# Patient Record
Sex: FEMALE | Race: WHITE | Employment: OTHER
[De-identification: names, ages, dates, MRNs, and addresses within clinical notes are randomized per-mention and may not be internally consistent; named-entity substitution may affect disease eponyms.]

---

## 2017-09-10 ENCOUNTER — HEALTH MAINTENANCE LETTER (OUTPATIENT)
Age: 22
End: 2017-09-10

## 2018-10-04 ENCOUNTER — TRANSFERRED RECORDS (OUTPATIENT)
Dept: HEALTH INFORMATION MANAGEMENT | Facility: CLINIC | Age: 23
End: 2018-10-04

## 2018-10-08 ENCOUNTER — MEDICAL CORRESPONDENCE (OUTPATIENT)
Dept: HEALTH INFORMATION MANAGEMENT | Facility: CLINIC | Age: 23
End: 2018-10-08

## 2018-10-12 ENCOUNTER — PRE VISIT (OUTPATIENT)
Dept: ORTHOPEDICS | Facility: CLINIC | Age: 23
End: 2018-10-12

## 2018-10-12 NOTE — TELEPHONE ENCOUNTER
RECORDS RECEIV[facility] [place]   DATE RECEIVED: in process   NOTES STATUS DETAILS   OFFICE NOTE from referring provider Received    OFFICE NOTE from other specialist In process    DISCHARGE SUMMARY from hospital In process    DISCHARGE REPORT from the ER In process    OPERATIVE REPORT In process    MEDICATION LIST In process    IMPLANT RECORD/STICKER N/A    LABS     CBC/DIFF In process    CULTURES N/A    INJECTIONS DONE IN RADIOLOGY In process    MRI In process    CT SCAN In process    XRAYS (IMAGES & REPORTS) In process    TUMOR     PATHOLOGY  Slides & report N/A

## 2018-11-21 ENCOUNTER — HEALTH MAINTENANCE LETTER (OUTPATIENT)
Age: 23
End: 2018-11-21

## 2018-11-21 DIAGNOSIS — M41.9 SCOLIOSIS: Primary | ICD-10-CM

## 2018-11-26 NOTE — PROGRESS NOTES
[facility]  Orthopedics  Noé Mayen MD  2018     Name: Anthony Hayden  MRN: [id number]  Age: 23 year old  : 1995  Referring provider: Referred Self     Chief Complaint:   Back pain     History of Present Illness:   Anthony Hayden is a 23 year old female with a history of spinal fusion with Dr. Mayen in  who presents today for evaluation of back pain. She was referred to me by OSCAR Rudolph at the [facility]. She was in the emergency room on 2018 for acute low back pain. She saw Dr. Escoto on 10/4/2018 for an ER discharge follow up, and at this appointment she declined the option physical therapy and requested to see a spine specialist. A CT of her lumbar spine was performed with contusion noted from T12 through L3. There was also a shallow disc protrusion at L4-5 and a small central protrusion at L5-S1 (these images are not available for review).    Today, she reports significant worsening of her back pain after the birth of her child approximately 2 years ago.  She had mild pain prior to this however since then she has had much more severe pain.  At the time of childbirth she had an epidural which reportedly injured a nerve and it was thought that her pain may improve as the nerve improved, however, she has had no improvement over the past couple years.  She describes her primary pain generator as her low back.  She reports some pain into her left lower extremity greater than right.  Approximately 80% of her pain is in the back and 20% in the left lower extremity.  She also has some soreness in her thoracic back as well as her neck or left arm.  She intermittently gets numbness tingling in the left lower extremity.  She has no bowel or bladder incontinence.  She is tried chiropractic care as well as strong pain medicine without improvement.  She has not tried any formal physical therapy recently, however, she has been doing home  "exercises.    SRS score: 45%  PROMIS 10: 20  VAS back: 8/10  VAS left le/10    Review of Systems:   A 10-point review of systems was obtained and is negative except for as noted in the HPI.     Medications:   HYDROcodone-acetaminophen (NORCO) 5-325 mg tablet    medroxyPROGESTERone acetate (DEPO-PROVERA CONTRACEPTIVE) intramuscular suspension 150 mg      Allergies:  Promethazine  Smoke  Doxycycline  Pertussis Vaccines    Past Medical History:  Seasonal allergic rhinitis due to pollen  Scoliosis  Chronic vascular insufficiency of intestine    Past Surgical History:  Extracavitary arthrodesis with lumbar diskectomy  Stomach surgery    Social History:  Patient has a 1 yo son and significant other. Patient does not smoke and drinks socially.    Family History:  Breast cancer  Heart disease   Hypertension    Physical Examination:  Height 1.6 m (5' 3\"), weight 55.8 kg (123 lb 1.6 oz).  Constitutional - Patient is healthy, well-nourished and appears stated age.  Respiratory - Patient is breathing normally and in no respiratory distress.  Skin - No suspicious rashes or lesions. Surgical incisions are well-healed.   Psychiatric - Normal mood and affect.  Cardiovascular - Peripheral pulses are normal.  Eyes - Visual acuity is normal to the written word.  ENT - Hearing intact to the spoken word.  Musculoskeletal - Non-antalgic gait without use of assistive devices.  She stands from a chair and gains the exam table easily.  Sensation is subjectively diminished in the L3-L4 distribution of the LLE.  Sensation is intact in L5-S1 of the left lower extremity and L3 through S1 of the right lower extremity.  She has 5 out of 5 strength in hip flexion, knee extension, dorsiflexion, plantar flexion.  Negative clonus, 1+ patellar and Achilles reflexes.  Negative Babinski.  Positive Trendelenburg with single leg stance bilaterally.  Tenderness with palpation of the right quadratus lumborum.     Right Left   MALKA  neg pos   Thigh " Thrust: neg pos   Pelvic Compression Test  neg neg   Pelvic Gapping  neg neg   Gaenslen s Test  neg pos              Imaging:   XR Six Foot Standing Extremities and two views complete spine xrays (11/28/2018):  X-rays were obtained today.  These show previous instrumentation in place from T5-L3.  Implants are in stable position compared to previous x-rays.  She has had no change in her alignment proximal to the construct.  There is no degenerative changes.  She has mild retrolisthesis of L3 on 4.     Pelvic incidence 60, lumbar lordosis 41, L4-5 lordosis 17.    I have independently reviewed the above imaging studies; the results were discussed with the patient and the imaging was shown to the patient.     Assessment:   23 year old female with a history of PISF for scoliosis in 2008 now with primarily low back pain with a small component of left leg radicular symptoms. Possible component of L SI joint pain.    Plan:   We reviewed the patient's imaging and clinical findings with her. Given that her pain became significantly worse with pregnancy and delivery as well as 3 out of 5 positive SI provocative maneuvers on exam today it would be worth obtaining a diagnostic injection of her SI joints to see if this could be the primary cause of her symptoms.  In addition to this, we will also plan to have her obtain a sedated MRI given her issues with claustrophobia.  She was able to obtain a CT scan at outside facility, however, she has not been able to have an MRI because of her claustrophobia.  This will help us evaluate the lumbar spine for causes of radicular symptoms.  Finally, we will have her obtain flexion-extension x-rays prior to her next visit given the slight retrolisthesis of L3 on 4.  All questions were answered.  We will see the patient back after the above studies have been completed for further discussion.    Aman James MD   Orthopedic Surgery; PGY-4    This patient was seen, examined and counseled  by Dr. Mayen.     I saw and evaluated the patient and developed the plan.

## 2018-11-28 ENCOUNTER — OFFICE VISIT (OUTPATIENT)
Dept: ORTHOPEDICS | Facility: CLINIC | Age: 23
End: 2018-11-28
Payer: COMMERCIAL

## 2018-11-28 ENCOUNTER — HOSPITAL ENCOUNTER (OUTPATIENT)
Facility: CLINIC | Age: 23
End: 2018-11-28
Admitting: ORTHOPAEDIC SURGERY
Payer: COMMERCIAL

## 2018-11-28 ENCOUNTER — RADIANT APPOINTMENT (OUTPATIENT)
Dept: GENERAL RADIOLOGY | Facility: CLINIC | Age: 23
End: 2018-11-28
Attending: ORTHOPAEDIC SURGERY
Payer: COMMERCIAL

## 2018-11-28 VITALS — BODY MASS INDEX: 21.81 KG/M2 | HEIGHT: 63 IN | WEIGHT: 123.1 LBS

## 2018-11-28 DIAGNOSIS — M54.50 CHRONIC BILATERAL LOW BACK PAIN WITHOUT SCIATICA: Primary | ICD-10-CM

## 2018-11-28 DIAGNOSIS — G89.29 CHRONIC BILATERAL LOW BACK PAIN WITHOUT SCIATICA: Primary | ICD-10-CM

## 2018-11-28 DIAGNOSIS — M41.124 ADOLESCENT IDIOPATHIC SCOLIOSIS OF THORACIC REGION: ICD-10-CM

## 2018-11-28 DIAGNOSIS — M41.9 SCOLIOSIS: ICD-10-CM

## 2018-11-28 DIAGNOSIS — Z98.1 HISTORY OF SPINAL FUSION: ICD-10-CM

## 2018-11-28 RX ORDER — SERTRALINE HYDROCHLORIDE 25 MG/1
25 TABLET, FILM COATED ORAL DAILY
Status: ON HOLD | COMMUNITY
Start: 2018-11-02 | End: 2018-12-18

## 2018-11-28 NOTE — LETTER
2018       RE: Anthony Hayden  [address] [place] [id number]     Dear Colleague,    Thank you for referring your patient, Anthony Hayden, to [facility]. Please see a copy of my visit note below.    [facility]  Orthopedics  Noé Mayen MD  2018     Name: Anthony Hayden  MRN: [id number]  Age: 23 year old  : 1995  Referring provider: Referred Self     Chief Complaint:   Back pain     History of Present Illness:   Anthony Hayden is a 23 year old female with a history of spinal fusion with Dr. Mayen in  who presents today for evaluation of back pain. She was referred to me by OSCAR Rudolph at the [facility]. She was in the emergency room on 2018 for acute low back pain. She saw Dr. Escoto on 10/4/2018 for an ER discharge follow up, and at this appointment she declined the option physical therapy and requested to see a spine specialist. A CT of her lumbar spine was performed with contusion noted from T12 through L3. There was also a shallow disc protrusion at L4-5 and a small central protrusion at L5-S1 (these images are not available for review).    Today, she reports significant worsening of her back pain after the birth of her child approximately 2 years ago.  She had mild pain prior to this however since then she has had much more severe pain.  At the time of childbirth she had an epidural which reportedly injured a nerve and it was thought that her pain may improve as the nerve improved, however, she has had no improvement over the past couple years.  She describes her primary pain generator as her low back.  She reports some pain into her left lower extremity greater than right.  Approximately 80% of her pain is in the back and 20% in the left lower extremity.  She also has some soreness in her thoracic back as well as her neck or left arm.  She  "intermittently gets numbness tingling in the left lower extremity.  She has no bowel or bladder incontinence.  She is tried chiropractic care as well as strong pain medicine without improvement.  She has not tried any formal physical therapy recently, however, she has been doing home exercises.    SRS score: 45%  PROMIS 10: 20  VAS back: 8/10  VAS left le/10    Review of Systems:   A 10-point review of systems was obtained and is negative except for as noted in the HPI.     Medications:   HYDROcodone-acetaminophen (NORCO) 5-325 mg tablet    medroxyPROGESTERone acetate (DEPO-PROVERA CONTRACEPTIVE) intramuscular suspension 150 mg      Allergies:  Promethazine  Smoke  Doxycycline  Pertussis Vaccines    Past Medical History:  Seasonal allergic rhinitis due to pollen  Scoliosis  Chronic vascular insufficiency of intestine    Past Surgical History:  Extracavitary arthrodesis with lumbar diskectomy  Stomach surgery    Social History:  Patient has a 3 yo son and significant other. Patient does not smoke and drinks socially.    Family History:  Breast cancer  Heart disease   Hypertension    Physical Examination:  Height 1.6 m (5' 3\"), weight 55.8 kg (123 lb 1.6 oz).  Constitutional - Patient is healthy, well-nourished and appears stated age.  Respiratory - Patient is breathing normally and in no respiratory distress.  Skin - No suspicious rashes or lesions. Surgical incisions are well-healed.   Psychiatric - Normal mood and affect.  Cardiovascular - Peripheral pulses are normal.  Eyes - Visual acuity is normal to the written word.  ENT - Hearing intact to the spoken word.  Musculoskeletal - Non-antalgic gait without use of assistive devices.  She stands from a chair and gains the exam table easily.  Sensation is subjectively diminished in the L3-L4 distribution of the LLE.  Sensation is intact in L5-S1 of the left lower extremity and L3 through S1 of the right lower extremity.  She has 5 out of 5 strength in hip flexion, " knee extension, dorsiflexion, plantar flexion.  Negative clonus, 1+ patellar and Achilles reflexes.  Negative Babinski.  Positive Trendelenburg with single leg stance bilaterally.  Tenderness with palpation of the right quadratus lumborum.     Right Left   MALKA  neg pos   Thigh Thrust: neg pos   Pelvic Compression Test  neg neg   Pelvic Gapping  neg neg   Gaenslen s Test  neg pos              Imaging:   XR Six Foot Standing Extremities and two views complete spine xrays (11/28/2018):  X-rays were obtained today.  These show previous instrumentation in place from T5-L3.  Implants are in stable position compared to previous x-rays.  She has had no change in her alignment proximal to the construct.  There is no degenerative changes.  She has mild retrolisthesis of L3 on 4.     Pelvic incidence 60, lumbar lordosis 41, L4-5 lordosis 17.    I have independently reviewed the above imaging studies; the results were discussed with the patient and the imaging was shown to the patient.     Assessment:   23 year old female with a history of PISF for scoliosis in 2008 now with primarily low back pain with a small component of left leg radicular symptoms. Possible component of L SI joint pain.    Plan:   We reviewed the patient's imaging and clinical findings with her. Given that her pain became significantly worse with pregnancy and delivery as well as 3 out of 5 positive SI provocative maneuvers on exam today it would be worth obtaining a diagnostic injection of her SI joints to see if this could be the primary cause of her symptoms.  In addition to this, we will also plan to have her obtain a sedated MRI given her issues with claustrophobia.  She was able to obtain a CT scan at outside facility, however, she has not been able to have an MRI because of her claustrophobia.  This will help us evaluate the lumbar spine for causes of radicular symptoms.  Finally, we will have her obtain flexion-extension x-rays prior to her next  visit given the slight retrolisthesis of L3 on 4.  All questions were answered.  We will see the patient back after the above studies have been completed for further discussion.    Aman James MD   Orthopedic Surgery; PGY-4    This patient was seen, examined and counseled by Dr. Mayen.     I saw and evaluated the patient and developed the plan.      Again, thank you for allowing me to participate in the care of your patient.      Sincerely,    Noé Mayen MD

## 2018-11-28 NOTE — MR AVS SNAPSHOT
After Visit Summary   11/28/2018    Anthony Hayden    MRN: [id number]           Patient Information     Date Of Birth          1995        Visit Information        Provider Department      11/28/2018 8:30 AM Noé Mayen [facility]        Today's Diagnoses     Chronic bilateral low back pain without sciatica    -  1       Follow-ups after your visit        Your next 10 appointments already scheduled     Dec 14, 2018  9:00 AM CST   Procedure 3 hour with U2A ROOM 7   [address] [facility] [place] ([facility])    [address] [facility] [address]               Dec 14, 2018 10:00 AM CST   MR LUMBAR SPINE W/O CONTRAST with UU IR RN, UUMR1   [facility] [place], Interventional Radiology ([facility])    [address] [facility] [facility] [address] [phone]           How do I prepare for my exam? (Food and drink instructions) **If you will be receiving sedation or general anesthesia, please see special notes below.**  How do I prepare for my exam? (Other instructions) Take your medicines as usual, unless your doctor tells you not to. Please remove any body piercings and hair extensions before you arrive. Follow your doctor s orders. If you do not, we may have to postpone your exam. You may or may not receive IV contrast for this exam pending the discretion of the Radiologist.  You do not need to do anything special to prepare. **If you will be receiving sedation or general anesthesia, please see special notes below.**  What should I wear:  The MRI machine uses a strong magnet. Please wear clothes without metal (snaps, zippers). A sweatsuit works well, or we may give you a hospital gown.  How long does the exam take: Most tests take 30 to 60 minutes.  HOWEVER, IF YOUR DOCTOR PRESCRIBES ANESTHESIA please plan on spending four to five hours in the recovery room.  What should I bring:  Bring a list of your current medicines to your exam (including vitamins, minerals and over-the-counter drugs). Also bring the results of similar scans you may have had.  Do I need a : **If you will be receiving sedation or general anesthesia, please see special notes below.**  What should I do after the exam? No Restrictions, You may resume normal activities.  What is this test: MRI (magnetic resonance imaging) uses a strong magnet and radio waves to look inside the body. An MRA (magnetic resonance angiogram) does the same thing, but it lets us look at your blood vessels. A computer turns the radio waves into pictures showing cross sections of the body, much like slices of bread. This helps us see any problems more clearly.  Who should I call with questions: Please call the Imaging Department at your exam site with any questions. Directions, parking instructions, and other information is available on our website, [email or web address].  How do I prepare if I m having sedation or anesthesia? **IMPORTANT** THE INSTRUCTIONS BELOW ARE ONLY FOR THOSE PATIENTS WHO HAVE BEEN TOLD THEY WILL RECEIVE SEDATION OR GENERAL ANESTHESIA DURING THEIR MRI PROCEDURE:  IF YOU WILL RECEIVE SEDATION (take medicine to help you relax during your exam): You must get the medicine from your doctor before you arrive. Bring the medicine to the exam. Do not take it at home. Arrive one hour early. Bring someone who can take you home after the test. Your medicine will make you sleepy. After the exam, you may not drive, take a bus or take a taxi by yourself. No eating 8 hours before your exam. You may have clear liquids up until 4 hours before your exam. (Clear liquids include water, clear tea, black coffee and fruit juice without pulp.)  IF YOU WILL RECEIVE ANESTHESIA (be asleep for your exam): Arrive 1 1/2 hours early. Bring someone who can take you home after the test. You may not drive, take a bus or take a taxi by yourself. No eating 8  hours before your exam. You may have clear liquids up until 4 hours before your exam. (Clear liquids include water, clear tea, black coffee and fruit juice without pulp.) You will spend four to five hours in the recovery room.            Dec 14, 2018  3:00 PM CST   CT SACROILIAC JOINT INJECTION DIAGNOSTIC with URCT2, UR NEURO RAD   [facility], [place], Radiology ([facility])    [address] [place] [address] [phone]           How do I prepare for my exam? (Food and drink instructions) The day before your exam: Drink extra fluids  at least six 8-ounce glasses (unless your doctor tells you to restrict fluids).  The day of your exam: No eating or drinking for 4 hours before your test. You may take medicine with small sips of water  What should I wear: Please wear loose clothing, such as a sweat suit or jogging clothes. Avoid snaps, zippers and other metal. We may ask you to undress and put on a hospital gown.  How long does the exam take: Plan to spend up to 6 hours at the hospital. The test itself normally takes less than an hour. We will watch for side effects for 1 to 4 hours.  What should I bring: Please bring any scans or X-rays taken at other hospitals, if they may be helpful. Also bring a list of your medicines, including vitamins, minerals and over-the-counter drugs. It is safest to leave personal items at home.  Do I need a : Plan for an adult to drive you home and stay with you until morning.  What do I need to tell my doctor? Tell your doctor in advance: * If you have any allergies. * If you are breastfeeding or there s any chance you are pregnant. * If you are taking Coumadin (or any other blood thinners) 5 days prior to the exam for any special instructions. * If you are diabetic to determine if your insulin needs have to be adjusted for the exam.  What should I do after the exam: When you get home, you ll need to take it easy for the  rest of the day.  Do not drive for 24 hours. You may have slight bruising and mild pain in the area. If so, you may take acetaminophen (Tylenol) or ibuprofen (Motrin, Advil) after you get home.  Some people go home with a small tube (catheter) sewn into the skin. If this case, we will show you how to care for the tube.  What is this test: This test uses a very thin needle to remove tissue, fluid or other cells from your body. We then send the cells to a lab for testing. A biopsy tests for disease in a tissue sample. Aspiration tests for disease or infection in a fluid sample. We use pictures from a CT (computed tomography) scan to guide the needle to the right place. A CT scan is a special X-ray. The scanner creates images of the body in cross sections, much like slices of bread. You may receive contrast (X-ray dye) before or during your scan. Contrast is given through an IV (small needle in your arm) or taken by mouth.  Who should I call with questions: If you have any questions, please call the imaging department where you will have your exam. Directions, parking instructions, and other information is available on our website, [email or web address].            Jan 30, 2019 12:45 PM CST   (Arrive by 12:15 PM)   RETURN SCOLIOSIS with Noé Mayen MD   [facility] ([facility])    [address] [place] [address] [phone]              Future tests that were ordered for you today     Open Future Orders        Priority Expected Expires Ordered    MR Lumbar Spine w/o Contrast Routine  11/28/2019 11/28/2018    CT Sacroiliac Therapeutic Joint Injection Routine  11/28/2019 11/28/2018            Who to contact     Please call your clinic at [phone] to:    Ask questions about your health    Make or cancel appointments    Discuss your medicines    Learn about your test results    Speak to your doctor            Additional Information About Your  "Visit        [facility] Information     [facility] gives you secure access to your electronic health record. If you see a primary care provider, you can also send messages to your care team and make appointments. If you have questions, please call your primary care clinic.  If you do not have a primary care provider, please call [phone] and they will assist you.      [facility] is an electronic gateway that provides easy, online access to your medical records. With [facility], you can request a clinic appointment, read your test results, renew a prescription or communicate with your care team.     To access your existing account, please contact your [facility] or call [phone] for assistance.        Care EveryWhere ID     This is your Care EveryWhere ID. This could be used by other organizations to access your [place] medical records  [id number]        Your Vitals Were     Height BMI (Body Mass Index)                1.6 m (5' 3\") 21.81 kg/m2           Blood Pressure from Last 3 Encounters:   No data found for BP    Weight from Last 3 Encounters:   11/28/18 55.8 kg (123 lb 1.6 oz)               Primary Care Provider Office Phone # Fax #    Noel Escoto, SHAMAR [phone]       [place] [address]        Equal Access to Services     LUZ WARD AH: Hadii aad ku hadasho Soomaali, waaxda luqadaha, qaybta kaalmada adeegyada, waxay naomiein haynnamdin lemuel ace. So [facility] [phone].    ATENCIÓN: Si habla español, tiene a feldman disposición servicios gratAgilvaxos de asistencia lingüística. Llame al [phone].    We comply with applicable federal civil rights laws and [place] laws. We do not discriminate on the basis of race, color, national origin, age, disability, sex, sexual orientation, or gender identity.            Thank you!     Thank you for choosing [facility]  for your care. Our goal is always to provide you " with excellent care. Hearing back from our patients is one way we can continue to improve our services. Please take a few minutes to complete the written survey that you may receive in the mail after your visit with us. Thank you!             Your Updated Medication List - Protect others around you: Learn how to safely use, store and throw away your medicines at www.disposemymeds.org.          This list is accurate as of 11/28/18 10:24 AM.  Always use your most recent med list.                   Brand Name Dispense Instructions for use Diagnosis    sertraline 25 MG tablet    ZOLOFT     Take 25 mg by mouth daily

## 2018-11-28 NOTE — NURSING NOTE
"Reason For Visit:   Chief Complaint   Patient presents with     Spine - RECHECK       Primary MD: Noel Escoto      ?  No  Currently working? No.  Type of injury: Chronic.  Date of surgery: March 2008 by   Type of surgery: rods for scoliosis  Smoker: No    Ht 1.6 m (5' 3\")  Wt 55.8 kg (123 lb 1.6 oz)  BMI 21.81 kg/m2    Pain Assessment  Patient Currently in Pain: Yes  0-10 Pain Scale: 8  Primary Pain Location: Back  Pain Orientation: Mid  Other Pain Locations: down to back of knees, left arm, entire left side is worse  Pain Descriptors: Jabbing, Crushing, Sharp, Shooting, Discomfort, Constant, Aching, Tingling, Throbbing    Oswestry (ISABELLE) Questionnaire    No flowsheet data found.         Neck Disability Index (NDI) Questionnaire    No flowsheet data found.           Visual Analog Pain Scale  Back Pain Scale 0-10: 8  Right leg pain: 0  Left leg pain: 5  Neck Pain Scale 0-10: 6  Right arm pain: 0  Left arm pain: 3    Promis 10 Assessment    PROMIS 10 11/17/2018   In general, would you say your health is: Fair   In general, would you say your quality of life is: Very good   In general, how would you rate your physical health? Fair   In general, how would you rate your mental health, including your mood and your ability to think? Good   In general, how would you rate your satisfaction with your social activities and relationships? Good   In general, please rate how well you carry out your usual social activities and roles Good   To what extent are you able to carry out your everyday physical activities such as walking, climbing stairs, carrying groceries, or moving a chair? A little   How often have you been bothered by emotional problems such as feeling anxious, depressed or irritable? Always   How would you rate your fatigue on average? Moderate   How would you rate your pain on average?   0 = No Pain  to  10 = Worst Imaginable Pain 8   In general, would you say your health is: 2   In general, " would you say your quality of life is: 4   In general, how would you rate your physical health? 2   In general, how would you rate your mental health, including your mood and your ability to think? 3   In general, how would you rate your satisfaction with your social activities and relationships? 3   In general, please rate how well you carry out your usual social activities and roles. (This includes activities at home, at work and in your community, and responsibilities as a parent, child, spouse, employee, friend, etc.) 3   To what extent are you able to carry out your everyday physical activities such as walking, climbing stairs, carrying groceries, or moving a chair? 2   In the past 7 days, how often have you been bothered by emotional problems such as feeling anxious, depressed, or irritable? 5   In the past 7 days, how would you rate your fatigue on average? 3   In the past 7 days, how would you rate your pain on average, where 0 means no pain, and 10 means worst imaginable pain? 8   Global Mental Health Score 11   Global Physical Health Score 9   PROMIS TOTAL - SUBSCORES 20   Some recent data might be hidden                Merly Solis, ATC

## 2018-12-05 ENCOUNTER — TELEPHONE (OUTPATIENT)
Dept: INTERVENTIONAL RADIOLOGY/VASCULAR | Facility: CLINIC | Age: 23
End: 2018-12-05

## 2018-12-05 NOTE — IR NOTE
Interventional Radiology Nursing Note    Patient Name: Anthony Hayden  Medical Record Number: [id number]  Today's Date: December 5, 2018    Procedure: 12/14 MRI with IV sedation    D: On 12/14/18, Patient is scheduled for a MRI scan with IV sedation at 1000 in the [facility] and a CT epidural injection at 1400 in the [facility]. Unable to coordinate both procedures occurring on the same campus on 12/14/18.   A: Spoke with Patient and offered to have the procedures rescheduled on a different day so that she would not need to go from one campus to the other. Patient declined. Emailed driving instructions to the [place] and directions and parking information for the [facility] to Patient's personal email. She expressed an understanding of the information discussed.   P: Procedure instructions and the IR nurse line phone number was given to the Patient. She denied having questions or concerns. She will call the IR nurse line if any issues arise.    Sheri Boyer RN  Interventional Radiology Nurse line: [phone]

## 2018-12-11 ENCOUNTER — TELEPHONE (OUTPATIENT)
Dept: INTERVENTIONAL RADIOLOGY/VASCULAR | Facility: CLINIC | Age: 23
End: 2018-12-11

## 2018-12-11 RX ORDER — SODIUM CHLORIDE 9 MG/ML
INJECTION, SOLUTION INTRAVENOUS CONTINUOUS
Status: CANCELLED | OUTPATIENT
Start: 2018-12-11

## 2018-12-11 RX ORDER — NICOTINE POLACRILEX 4 MG
15-30 LOZENGE BUCCAL
Status: CANCELLED | OUTPATIENT
Start: 2018-12-11

## 2018-12-11 RX ORDER — DEXTROSE MONOHYDRATE 25 G/50ML
25-50 INJECTION, SOLUTION INTRAVENOUS
Status: CANCELLED | OUTPATIENT
Start: 2018-12-11

## 2018-12-11 RX ORDER — LIDOCAINE 40 MG/G
CREAM TOPICAL
Status: CANCELLED | OUTPATIENT
Start: 2018-12-11

## 2018-12-18 ENCOUNTER — HOSPITAL ENCOUNTER (OUTPATIENT)
Facility: CLINIC | Age: 23
Discharge: HOME | End: 2018-12-18
Attending: RADIOLOGY | Admitting: RADIOLOGY
Payer: COMMERCIAL

## 2018-12-18 ENCOUNTER — HOSPITAL ENCOUNTER (OUTPATIENT)
Dept: INTERVENTIONAL RADIOLOGY/VASCULAR | Facility: CLINIC | Age: 23
End: 2018-12-18
Attending: ORTHOPAEDIC SURGERY | Admitting: RADIOLOGY
Payer: COMMERCIAL

## 2018-12-18 ENCOUNTER — APPOINTMENT (OUTPATIENT)
Dept: MEDSURG UNIT | Facility: CLINIC | Age: 23
End: 2018-12-18
Attending: RADIOLOGY
Payer: COMMERCIAL

## 2018-12-18 VITALS
SYSTOLIC BLOOD PRESSURE: 103 MMHG | DIASTOLIC BLOOD PRESSURE: 69 MMHG | RESPIRATION RATE: 16 BRPM | OXYGEN SATURATION: 96 % | HEART RATE: 74 BPM

## 2018-12-18 VITALS
DIASTOLIC BLOOD PRESSURE: 70 MMHG | RESPIRATION RATE: 20 BRPM | OXYGEN SATURATION: 99 % | TEMPERATURE: 98.3 F | SYSTOLIC BLOOD PRESSURE: 109 MMHG

## 2018-12-18 DIAGNOSIS — M54.50 CHRONIC BILATERAL LOW BACK PAIN WITHOUT SCIATICA: ICD-10-CM

## 2018-12-18 DIAGNOSIS — G89.29 CHRONIC BILATERAL LOW BACK PAIN WITHOUT SCIATICA: ICD-10-CM

## 2018-12-18 LAB — B-HCG SERPL-ACNC: <1 IU/L (ref 0–5)

## 2018-12-18 PROCEDURE — 72148 MRI LUMBAR SPINE W/O DYE: CPT

## 2018-12-18 PROCEDURE — 84702 CHORIONIC GONADOTROPIN TEST: CPT | Performed by: RADIOLOGY

## 2018-12-18 PROCEDURE — 40000166 ZZH STATISTIC PP CARE STAGE 1

## 2018-12-18 PROCEDURE — 25000128 H RX IP 250 OP 636: Performed by: PHYSICIAN ASSISTANT

## 2018-12-18 RX ORDER — NICOTINE POLACRILEX 4 MG
15-30 LOZENGE BUCCAL
Status: DISCONTINUED | OUTPATIENT
Start: 2018-12-18 | End: 2018-12-19 | Stop reason: HOSPADM

## 2018-12-18 RX ORDER — DEXTROSE MONOHYDRATE 25 G/50ML
25-50 INJECTION, SOLUTION INTRAVENOUS
Status: CANCELLED | OUTPATIENT
Start: 2018-12-18

## 2018-12-18 RX ORDER — ONDANSETRON 2 MG/ML
4 INJECTION INTRAMUSCULAR; INTRAVENOUS EVERY 6 HOURS PRN
Status: CANCELLED | OUTPATIENT
Start: 2018-12-18

## 2018-12-18 RX ORDER — FENTANYL CITRATE 50 UG/ML
25-50 INJECTION, SOLUTION INTRAMUSCULAR; INTRAVENOUS EVERY 5 MIN PRN
Status: DISCONTINUED | OUTPATIENT
Start: 2018-12-18 | End: 2018-12-18 | Stop reason: HOSPADM

## 2018-12-18 RX ORDER — LIDOCAINE 40 MG/G
CREAM TOPICAL
Status: DISCONTINUED | OUTPATIENT
Start: 2018-12-18 | End: 2018-12-19 | Stop reason: HOSPADM

## 2018-12-18 RX ORDER — SODIUM CHLORIDE 9 MG/ML
INJECTION, SOLUTION INTRAVENOUS CONTINUOUS
Status: DISCONTINUED | OUTPATIENT
Start: 2018-12-18 | End: 2018-12-19 | Stop reason: HOSPADM

## 2018-12-18 RX ORDER — NALOXONE HYDROCHLORIDE 0.4 MG/ML
.1-.4 INJECTION, SOLUTION INTRAMUSCULAR; INTRAVENOUS; SUBCUTANEOUS
Status: DISCONTINUED | OUTPATIENT
Start: 2018-12-18 | End: 2018-12-18 | Stop reason: HOSPADM

## 2018-12-18 RX ORDER — DEXTROSE MONOHYDRATE 25 G/50ML
25-50 INJECTION, SOLUTION INTRAVENOUS
Status: DISCONTINUED | OUTPATIENT
Start: 2018-12-18 | End: 2018-12-19 | Stop reason: HOSPADM

## 2018-12-18 RX ORDER — ONDANSETRON 2 MG/ML
4 INJECTION INTRAMUSCULAR; INTRAVENOUS EVERY 6 HOURS PRN
Status: DISCONTINUED | OUTPATIENT
Start: 2018-12-18 | End: 2018-12-19 | Stop reason: HOSPADM

## 2018-12-18 RX ORDER — NICOTINE POLACRILEX 4 MG
15-30 LOZENGE BUCCAL
Status: CANCELLED | OUTPATIENT
Start: 2018-12-18

## 2018-12-18 RX ORDER — ONDANSETRON 2 MG/ML
4 INJECTION INTRAMUSCULAR; INTRAVENOUS ONCE
Status: COMPLETED | OUTPATIENT
Start: 2018-12-18 | End: 2018-12-18

## 2018-12-18 RX ORDER — FLUMAZENIL 0.1 MG/ML
0.2 INJECTION, SOLUTION INTRAVENOUS
Status: DISCONTINUED | OUTPATIENT
Start: 2018-12-18 | End: 2018-12-18 | Stop reason: HOSPADM

## 2018-12-18 RX ADMIN — FENTANYL CITRATE 75 MCG: 50 INJECTION, SOLUTION INTRAMUSCULAR; INTRAVENOUS at 15:34

## 2018-12-18 RX ADMIN — MIDAZOLAM HYDROCHLORIDE 1.5 MG: 2 INJECTION, SOLUTION INTRAMUSCULAR; INTRAVENOUS at 15:34

## 2018-12-18 RX ADMIN — ONDANSETRON 4 MG: 2 INJECTION INTRAMUSCULAR; INTRAVENOUS at 16:00

## 2018-12-18 NOTE — IP AVS SNAPSHOT
[facility], [place], Interventional Radiology  [address] [address]  Phone:  [phone]                                    After Visit Summary   12/18/2018    Anthony Hayden    MRN: [id number]           After Visit Summary Signature Page    I have received my discharge instructions, and my questions have been answered. I have discussed any challenges I see with this plan with the nurse or doctor.    ..........................................................................................................................................  Patient/Patient Representative Signature      ..........................................................................................................................................  Patient Representative Print Name and Relationship to Patient    ..................................................               ................................................  Date                                   Time    ..........................................................................................................................................  Reviewed by Signature/Title    ...................................................              ..............................................  Date                                               Time          22EPIC Rev 08/18

## 2018-12-18 NOTE — PRE-PROCEDURE
Interventional Radiology Pre-Procedure Sedation Assessment   Time of Assessment: 3:00 PM    Expected Level: Moderate Sedation    Indication: Sedation is required for the following type of Procedure: MRI sedation    Sedation and procedural consent: Risks, benefits and alternatives were discussed with Patient    PO Intake: Appropriately NPO for procedure    ASA Class: Class 2 - MILD SYSTEMIC DISEASE, NO ACUTE PROBLEMS, NO FUNCTIONAL LIMITATIONS.    Mallampati: Grade 2:  Soft palate, base of uvula, tonsillar pillars, and portion of posterior pharyngeal wall visible    Lungs: Lungs Clear with good breath sounds bilaterally    Heart: Normal heart sounds and rate    History and physical reviewed and no updates needed. I have reviewed the lab findings, diagnostic data, medications, and the plan for sedation. I have determined this patient to be an appropriate candidate for the planned sedation and procedure and have reassessed the patient IMMEDIATELY PRIOR to sedation and procedure.    Natanael Kovacs, DO

## 2018-12-18 NOTE — PROGRESS NOTES
Patient is ready for the procedure, Here for MRI with sedation. Step dad is with her. Very anxious and nervous. Hcg sent to lab, awaiting results

## 2018-12-18 NOTE — DISCHARGE INSTRUCTIONS
[facility]  Going Home after MRI with Sedation      For 24 hours:    An adult should stay with you.    Relax and take it easy.    DO NOT make any important legal decisions.    DO NOT drive or operate machines at home or at work.    Resume your regular diet and drink plenty of fluids.    CALL THE PHYSICIAN IF:    You develop nausea or vomiting    You develop hives or a rash or any unexplained itching    ADDITIONAL INSTRUCTIONS: None    [facility] INTERVENTIONAL RADIOLOGY DEPARTMENT        Procedure Physician:    Dr. Gomes    Date of procedure:December 18, 2018        Telephone numbers:     [phone]      Monday-Friday 8:00 am to 4:30 pm                                               [phone]      After 4:30 pm Monday-Friday, Weekends & Holidays. Ask for the Interventional Radiologist on call. Someone is  available 24 hours/day        [facility] toll free number: [phone]  Monday-Friday 8:00 am to 4:30 pm

## 2018-12-18 NOTE — PROGRESS NOTES
1555 Pt arrived on 2a post MRI with sedation. VSS Ra. Family at bedside. Pt exp mild nausea at this time, pt did receive zofran prior to arrival on 2a. 1615 Nausea resolved. Pt eating and drinking. 1645 Pt tolerating oral intake. Discharge instructions reviewed, copy given to pt. Pt tolerated ambulation. PIV dc'd. 1655 Pt discharged home accompanied by family.

## 2018-12-18 NOTE — PROGRESS NOTES
Patient Name: Anthony Hayden  Medical Record Number: [id number]  Today's Date: 12/18/2018    Procedure: MRI Lumbar spine with IV sedation    Sedation start time: 1458  Sedation end time: 1528  Sedation medications administered: versed 1.5 Mg;fentanyl   75mcg   Total sedation time: 30 minutes  Sedation Notes: Pt comfortable, VSS    MRI start time: 1515  Puncture time: no puncture  MRI end time: 1528    Report given to: 2A [place] [facility]ONE  : none needed    Other Notes: Pt arrived to MRI  from . Consent obtained. Pt denies any questions or concerns regarding procedure. Pt positioned supine and monitored per protocol. Pt tolerated procedure without any noted complications. Pt transferred back to .   sudden onset

## 2019-01-15 DIAGNOSIS — M41.9 SCOLIOSIS: Primary | ICD-10-CM

## 2019-01-16 ENCOUNTER — ANCILLARY PROCEDURE (OUTPATIENT)
Dept: GENERAL RADIOLOGY | Facility: CLINIC | Age: 24
End: 2019-01-16
Payer: COMMERCIAL

## 2019-01-16 ENCOUNTER — OFFICE VISIT (OUTPATIENT)
Dept: ORTHOPEDICS | Facility: CLINIC | Age: 24
End: 2019-01-16
Payer: COMMERCIAL

## 2019-01-16 VITALS — BODY MASS INDEX: 22.44 KG/M2 | WEIGHT: 126.7 LBS

## 2019-01-16 DIAGNOSIS — M41.9 SCOLIOSIS: ICD-10-CM

## 2019-01-16 DIAGNOSIS — M47.816 FACET ARTHROPATHY, LUMBAR: Primary | ICD-10-CM

## 2019-01-16 DIAGNOSIS — Z98.1 S/P SPINAL FUSION: ICD-10-CM

## 2019-01-16 DIAGNOSIS — G25.81 RESTLESS LEG SYNDROME: ICD-10-CM

## 2019-01-16 NOTE — NURSING NOTE
Reason For Visit:   Chief Complaint   Patient presents with     Spine - RECHECK     Follow Up     MRI results         Primary MD: Noel Escoto        ?  No  Currently working? No.  Type of injury: Chronic.  Date of surgery: March 2008 by   Type of surgery: rods for scoliosis  Smoker: No        Wt 57.5 kg (126 lb 11.2 oz)   BMI 22.44 kg/m      Pain Assessment  Patient Currently in Pain: Yes  0-10 Pain Scale: 9  Primary Pain Location: (mid to lower back)  Other Pain Locations: legs  Pain Descriptors: Aching, Throbbing, Sharp, Constant  Alleviating Factors: Heat  Aggravating Factors: Movement, Sitting, Walking, Standing, Bending, Stretching, Exercise, Stairs    Oswestry (ISABELLE) Questionnaire    No flowsheet data found.               Visual Analog Pain Scale  Back Pain Scale 0-10: 8.5  Right leg pain: 8.5(feels like restless leg syndrome)  Left leg pain: 8.5(feels like restless leg syndorme)    Promis 10 Assessment    PROMIS 10 1/14/2019   In general, would you say your health is: Fair   In general, would you say your quality of life is: Very good   In general, how would you rate your physical health? Fair   In general, how would you rate your mental health, including your mood and your ability to think? Good   In general, how would you rate your satisfaction with your social activities and relationships? Good   In general, please rate how well you carry out your usual social activities and roles Very good   To what extent are you able to carry out your everyday physical activities such as walking, climbing stairs, carrying groceries, or moving a chair? A little   How often have you been bothered by emotional problems such as feeling anxious, depressed or irritable? Always   How would you rate your fatigue on average? Mild   How would you rate your pain on average?   0 = No Pain  to  10 = Worst Imaginable Pain 7   In general, would you say your health is: 2   In general, would you say your quality  of life is: 4   In general, how would you rate your physical health? 2   In general, how would you rate your mental health, including your mood and your ability to think? 3   In general, how would you rate your satisfaction with your social activities and relationships? 3   In general, please rate how well you carry out your usual social activities and roles. (This includes activities at home, at work and in your community, and responsibilities as a parent, child, spouse, employee, friend, etc.) 4   To what extent are you able to carry out your everyday physical activities such as walking, climbing stairs, carrying groceries, or moving a chair? 2   In the past 7 days, how often have you been bothered by emotional problems such as feeling anxious, depressed, or irritable? 5   In the past 7 days, how would you rate your fatigue on average? 2   In the past 7 days, how would you rate your pain on average, where 0 means no pain, and 10 means worst imaginable pain? 7   Global Mental Health Score 11   Global Physical Health Score 10   PROMIS TOTAL - SUBSCORES 21   Some recent data might be hidden                Merly Solis, ATC

## 2019-01-16 NOTE — PROGRESS NOTES
"REASON FOR VISIT: Recheck low back and leg pain    REFERRING PHYSICIAN: Referred Self     PRIMARY CARE PHYSICIAN: Noel Escoto    HISTORY OF PRESENT ILLNESS: Anthony Hayden is a 23 year old female who returns to clinic today for further follow-up of low back and bilateral leg pain.  She has a history of posterior spinal fusion, which was performed by Dr. Mayen in March 2008.  She returned to clinic on 11/28/18 with significant low back and leg pain after a pregnancy.  At that time, the plan was to get a CT-guided sacroiliac joint injection.  However, she had to cancel the injection because she was sick, and she was not able to reschedule.  A few days after that appointment, she started to develop the same symptoms in the right leg.  She now reports pain in both legs that affects the posterior aspect from the thighs to the calves.  She states that they feel \"jumpy\" especially at night.  She has tried flexeril, which does not help.  She has never been evaluated for restless leg syndrome.  She is also here because of low back pain that no longer responds to ibuprofen or use of a TENS unit.         PHYSICAL EXAM:   Constitutional - Patient is healthy, well-nourished and appears stated age.    BMI = 22.44    Respiratory - Patient is breathing normally and in no respiratory distress.   Skin - No suspicious rashes or lesions.   Psychiatric - Normal mood and affect.   Cardiovascular - Extremities are warm and well perfused.   Eyes - Visual acuity is normal to the written word.   ENT - Hearing intact to the spoken word.   GI - No abdominal distention.   Musculoskeletal - Antalgic gait without use of assistive devices.          Thoracolumbar spine:    Appearance - Well healed surgical incision    Palpation - Non-tender to palpation in the midline and sacroiliac joints bilaterally;  TTP in the right lumbar facets    ROM - Deferred    Motor -        LOWER EXTREMITY Left Right   Hip flexion 5/5 5/5   Knee flexion 5/5 5/5 "   Knee extension 5/5 5/5   Ankle dorsiflexion 5/5 5/5   Ankle plantarflexion 5/5 5/5   Great toe extension 5/5 5/5        Special tests -     Facet loading - Positive bilaterally but more profound on the right     Neurologic - Sensation intact to light touch bilaterally in the lower extremities.        IMAGING: Radiographs of the full spine were obtained today.  They show instrumentation that is intact without evidence of loosening, fracture or migration.  Flexion/extension films of the lumbar spine were also obtained today.  They show slight retrolisthesis of L3 on L4 without evidence of instability. See full radiologic report in chart.    CLINICAL ASSESSMENT:   1. S/P posterior spinal fusion, T5-L3  2. Lumbar facet arthropathy  3. Restless leg syndrome    DISCUSSION/PLAN:   Anthony is a 23 year old female who returned to clinic today for further follow-up of low back and bilateral leg pain.  She was previously seen on 11/28/18 when an SI joint injection was suggested.  However, this was cancelled due to illness.  Today, her pain seems higher than the SI joints, and facet loading was profoundly positive.  We discussed this finding in detail, including the relevant anatomy.  At this time, I recommend a referral to the pain clinic for medial branch blocks at L3-4 bilaterally.  Additionally, I would like her to be assessed and potentially treated for restless leg syndrome.  If she gets relief, she can follow-up as needed.  If these interventions are insufficient, she should return to clinic to see Dr. Mayen for further evaluation.    25 minutes were spent with the patient, >50% of which was spent in discussion regarding the diagnosis, treatment options, and answering questions.    All questions and concerns were answered to the patient's apparent satisfaction before leaving the clinic.     Thank you for allowing Dr. Mayen and I to participate in the care of Anthony Hayden.    Respectfully,  Ananya Jones,  MARIVEL    CC  Copy to patient    [address] [address]

## 2019-01-16 NOTE — LETTER
"1/16/2019       RE: Anthony Hayden  [address] [place] [id number]     Dear Colleague,    Thank you for referring your patient, Anthony Hayden, to the [facility]. Please see a copy of my visit note below.    REASON FOR VISIT: Recheck low back and leg pain    REFERRING PHYSICIAN: Referred Self     PRIMARY CARE PHYSICIAN: Noel Escoto    HISTORY OF PRESENT ILLNESS: Anthony Hayden is a 23 year old female who returns to clinic today for further follow-up of low back and bilateral leg pain.  She has a history of posterior spinal fusion, which was performed by Dr. Mayen in March 2008.  She returned to clinic on 11/28/18 with significant low back and leg pain after a pregnancy.  At that time, the plan was to get a CT-guided sacroiliac joint injection.  However, she had to cancel the injection because she was sick, and she was not able to reschedule.  A few days after that appointment, she started to develop the same symptoms in the right leg.  She now reports pain in both legs that affects the posterior aspect from the thighs to the calves.  She states that they feel \"jumpy\" especially at night.  She has tried flexeril, which does not help.  She has never been evaluated for restless leg syndrome.  She is also here because of low back pain that no longer responds to ibuprofen or use of a TENS unit.         PHYSICAL EXAM:   Constitutional - Patient is healthy, well-nourished and appears stated age.    BMI = 22.44    Respiratory - Patient is breathing normally and in no respiratory distress.   Skin - No suspicious rashes or lesions.   Psychiatric - Normal mood and affect.   Cardiovascular - Extremities are warm and well perfused.   Eyes - Visual acuity is normal to the written word.   ENT - Hearing intact to the spoken word.   GI - No abdominal distention.   Musculoskeletal - Antalgic gait without use of assistive devices.          " Thoracolumbar spine:    Appearance - Well healed surgical incision    Palpation - Non-tender to palpation in the midline and sacroiliac joints bilaterally;  TTP in the right lumbar facets    ROM - Deferred    Motor -        LOWER EXTREMITY Left Right   Hip flexion 5/5 5/5   Knee flexion 5/5 5/5   Knee extension 5/5 5/5   Ankle dorsiflexion 5/5 5/5   Ankle plantarflexion 5/5 5/5   Great toe extension 5/5 5/5        Special tests -     Facet loading - Positive bilaterally but more profound on the right     Neurologic - Sensation intact to light touch bilaterally in the lower extremities.        IMAGING: Radiographs of the full spine were obtained today.  They show instrumentation that is intact without evidence of loosening, fracture or migration.  Flexion/extension films of the lumbar spine were also obtained today.  They show slight retrolisthesis of L3 on L4 without evidence of instability. See full radiologic report in chart.    CLINICAL ASSESSMENT:   1. S/P posterior spinal fusion, T5-L3  2. Lumbar facet arthropathy  3. Restless leg syndrome    DISCUSSION/PLAN:   Anthony is a 23 year old female who returned to clinic today for further follow-up of low back and bilateral leg pain.  She was previously seen on 11/28/18 when an SI joint injection was suggested.  However, this was cancelled due to illness.  Today, her pain seems higher than the SI joints, and facet loading was profoundly positive.  We discussed this finding in detail, including the relevant anatomy.  At this time, I recommend a referral to the pain clinic for medial branch blocks at L3-4 bilaterally.  Additionally, I would like her to be assessed and potentially treated for restless leg syndrome.  If she gets relief, she can follow-up as needed.  If these interventions are insufficient, she should return to clinic to see Dr. Mayen for further evaluation.    25 minutes were spent with the patient, >50% of which was spent in discussion regarding the  diagnosis, treatment options, and answering questions.    All questions and concerns were answered to the patient's apparent satisfaction before leaving the clinic.     Thank you for allowing Dr. Mayen and I to participate in the care of Anthony Hayden.    Respectfully,  Ananya Jones PA-C    CC  Copy to patient    [address] [place] [id number]

## 2019-02-21 ASSESSMENT — ENCOUNTER SYMPTOMS
DECREASED APPETITE: 1
MEMORY LOSS: 1
HEADACHES: 1
STIFFNESS: 0
COUGH DISTURBING SLEEP: 1
NECK PAIN: 1
RECTAL PAIN: 0
COUGH: 1
NERVOUS/ANXIOUS: 1
BOWEL INCONTINENCE: 0
MUSCLE CRAMPS: 1
DIARRHEA: 0
JOINT SWELLING: 0
HALLUCINATIONS: 0
NUMBNESS: 0
WEAKNESS: 1
SHORTNESS OF BREATH: 0
SPUTUM PRODUCTION: 0
SNORES LOUDLY: 1
MUSCLE WEAKNESS: 1
DIZZINESS: 1
JAUNDICE: 0
MYALGIAS: 1
SPEECH CHANGE: 0
LOSS OF CONSCIOUSNESS: 0
HEARTBURN: 1
POLYDIPSIA: 1
WEIGHT LOSS: 0
ALTERED TEMPERATURE REGULATION: 1
NAUSEA: 1
POSTURAL DYSPNEA: 0
BLOOD IN STOOL: 0
DISTURBANCES IN COORDINATION: 0
DEPRESSION: 0
POLYPHAGIA: 0
INCREASED ENERGY: 1
PARALYSIS: 0
HEMOPTYSIS: 0
SEIZURES: 0
WHEEZING: 0
BACK PAIN: 1
VOMITING: 1
DYSPNEA ON EXERTION: 0
CONSTIPATION: 0
TREMORS: 0
WEIGHT GAIN: 0
FEVER: 0
FATIGUE: 0
ABDOMINAL PAIN: 1
ARTHRALGIAS: 1
TINGLING: 1
CHILLS: 0
INSOMNIA: 1
DECREASED CONCENTRATION: 1
NIGHT SWEATS: 1
PANIC: 1
BLOATING: 0

## 2019-02-21 ASSESSMENT — ANXIETY QUESTIONNAIRES
GAD7 TOTAL SCORE: 20
7. FEELING AFRAID AS IF SOMETHING AWFUL MIGHT HAPPEN: NEARLY EVERY DAY
5. BEING SO RESTLESS THAT IT IS HARD TO SIT STILL: NEARLY EVERY DAY
3. WORRYING TOO MUCH ABOUT DIFFERENT THINGS: NEARLY EVERY DAY
2. NOT BEING ABLE TO STOP OR CONTROL WORRYING: NEARLY EVERY DAY
7. FEELING AFRAID AS IF SOMETHING AWFUL MIGHT HAPPEN: NEARLY EVERY DAY
GAD7 TOTAL SCORE: 20
6. BECOMING EASILY ANNOYED OR IRRITABLE: MORE THAN HALF THE DAYS
1. FEELING NERVOUS, ANXIOUS, OR ON EDGE: NEARLY EVERY DAY
4. TROUBLE RELAXING: NEARLY EVERY DAY

## 2019-02-22 ASSESSMENT — ANXIETY QUESTIONNAIRES: GAD7 TOTAL SCORE: 20

## 2019-03-07 ENCOUNTER — DOCUMENTATION ONLY (OUTPATIENT)
Dept: ORTHOPEDICS | Facility: CLINIC | Age: 24
End: 2019-03-07

## 2019-03-07 ENCOUNTER — OFFICE VISIT (OUTPATIENT)
Dept: ANESTHESIOLOGY | Facility: CLINIC | Age: 24
End: 2019-03-07
Payer: COMMERCIAL

## 2019-03-07 VITALS
SYSTOLIC BLOOD PRESSURE: 105 MMHG | WEIGHT: 130 LBS | DIASTOLIC BLOOD PRESSURE: 73 MMHG | RESPIRATION RATE: 16 BRPM | BODY MASS INDEX: 23.04 KG/M2 | HEIGHT: 63 IN | HEART RATE: 79 BPM

## 2019-03-07 DIAGNOSIS — M47.816 LUMBAR FACET ARTHROPATHY: Primary | ICD-10-CM

## 2019-03-07 DIAGNOSIS — G57.13 MERALGIA PARESTHETICA OF BOTH LOWER EXTREMITIES: ICD-10-CM

## 2019-03-07 RX ORDER — GABAPENTIN 300 MG/1
600 CAPSULE ORAL 3 TIMES DAILY
Qty: 180 CAPSULE | Refills: 0 | Status: SHIPPED | OUTPATIENT
Start: 2019-03-07 | End: 2022-07-02

## 2019-03-07 ASSESSMENT — MIFFLIN-ST. JEOR: SCORE: 1308.81

## 2019-03-07 ASSESSMENT — PAIN SCALES - GENERAL: PAINLEVEL: EXTREME PAIN (8)

## 2019-03-07 NOTE — PATIENT INSTRUCTIONS
1. Start gabapentin as follows:  1 tab= 300mg    AM   PM   Bedtime  0   0   300mg (1 tab).  After 1-3 days, increase as tolerated to the next line  300mg (1 tab)  0   300 (1 tab).  After 3-4 days, increase as tolerated to the next line  300mg (1 tab)  300mg (1 tab)  300 (1 tab).  After 3-4 days, increase as tolerated to the next line    AM   PM   Bedtime  300   300   600mg (2 tab).  After 3-4 days, increase as tolerated to the next line  600mg (2 tab)  300   600 (2 tab).  After 3-4 days, increase as tolerated to the next line  600mg (2 tab)  600mg (2 tab)  600 (2 tab).  After 3-4 days, increase as tolerated to the next line  Call with any problems or when you are at this dose. We can prescribe 600mg tabs going forwards.     Caution for sedation.   Do not drive until you know how the medication affects you.   You can go slower if you need to or increasing only one dose at a time.  Do not stop abruptly once at higher doses.  This medication must be tapered off.    2. External referral to pain psychology for CBT and biofeedback.     3. External referral to physical therapy.    4. Lidocaine 4% patches recommended.  You can purchase this medication over the counter at your pharmacy.  Use as directed.      5. Schedule procedure.      Please call [phone] to schedule, reschedule, or cancel your procedure appointment.   Phones are answered Monday - Friday from 7:30 - 4:00pm.  Leave a voicemail with your name, birth date, and phone number if no one is available to take your call.     Your procedure: Bilateral lumbar medial branch nerve block     On the day of the procedure  1. Arrive 1 hour earlier than your scheduled time, to the [facility]  Address: [address] [place] [id number]  2. Check in on the 5th floor for your procedure    For your diagnostic procedure, please fax in your Pain Diary.  Our fax number is [phone]    If you must reschedule your procedure more than two times, you must  follow up in clinic before rescheduling again.        Preparing for your procedure    CAUTION - FAILURE TO FOLLOW THESE PRE-PROCEDURE INSTRUCTIONS WILL RESULT IN YOUR PROCEDURE BEING RESCHEDULED.      Pregnancy  If you are pregnant, or think you may be pregnant, please notify our staff. This may or may not affect the ability to perform the procedure.       You must have a  take you home after your procedure. Transportation by taxi or para-transit is okay as long as you have a responsible adult accompany you. You must provide your 's full name and contact number at time of check in.     Fasting Protocol You may have NOTHING SOLID TO EAT 8 HOURS prior to arrival at the procedure area.     You may have CLEAR LIQUIDS UP TO 2 HOURS prior to arrival.    Broth and candy are considered solid food and require an eight hour fast.     Clear liquids include water, clear fruit juice (no pulp), carbonated beverages, ice, black coffee, black tea, clear jello. No alcohol containing beverages.   Medications If you take any medications, DO NOT STOP. Take your medications as usual the day of your procedure with a sip of water AT LEAST 2 HOURS PRIOR TO ARRIVAL.    Antibiotics If you are currently taking antibiotics, you must complete the entire dose 7 days prior to your scheduled procedure. You must be clear of any signs or symptoms of infection. If you begin antibiotics, please contact our clinic for instructions.     Fever, Chills, or Rash If you experience a fever of higher than 100 degrees, chills, rash, or open wounds during the one week before your procedure, please call the clinic to see if you may proceed with your procedure.      Medication Hold List  **Patients under Cardiology/Neurology care should consult their provider prior to the pain procedure to verify pre-procedure medication instructions. The information below contains general guidelines.**    Blood Thinners If you are taking daily ASPIRIN, PLAVIX,  OR OTHER BLOOD THINNERS SUCH AS COUMADIN/WARFARIN, we will need your prescribing doctor to sign a release permitting you to stop these medications. Once approved by your prescribing doctor - STOP ALL BLOOD THINNERS BASED ON THE TIME TABLE BELOW PRIOR TO YOUR PROCEDURE. If you have been instructed to stop WARFARIN(COUMADIN), you must have an INR lab drawn the day before your procedure. . Your INR must be within normal limits before we can perform your injection. MEDICATIONS CAN BE RESTARTED AFTER YOUR PROCEDURE.    14 DAY HOLD  Ticlid (ticlopidine)    10 DAY HOLD  Effient (Prasugel)    3 DAY HOLD  Xarelto (rivaroxaban) 7 DAY HOLD  Anacin, Bufferin, Ecotrin, Excedrin, Aggrenox (Aspirin)  Brilinta (ticagrelor)  Coumadin (Warfarin)  Pradexa (Dabigatran)  Elmiron (Pentosan)  Plavix (Clopidogrel Bisulfate)  Pletal (Cilostazol)    24 HOUR HOLD  Lovenox (enoxaparin)  Agrylin (Anagrelide)        Non-steroidal Anti-inflammatories (NSAIDs) DO NOT TAKE any non-steroidal anti-inflammatory medications (NSAIDs) listed on the table below. MEDICATIONS CAN BE RESTARTED AFTER YOUR PROCEDURE. Celebrex is OK to take and does not need to be discontinued.     Medications to stop:  3 DAY HOLD  Advil, Motrin (Ibuprofen)  Arthrotec (diciofenac sodium/misoprostol)  Clinoril (Sulindac)  Indocin (Indomethacin)  Lodine (Etodolac)  Toradol (Ketorolac)  Vicoprofen (Hydrocodone and Ibuprofen)  Voltaren (Diclotenac)    14 DAY HOLD  Daypro (Oxaprozin)  Feldene (Piroxicam)   7 DAY HOLD  Aleve (Naproxen sodium)  Darvon compound (contains aspirin)  Naprosyn (Naproxen)  Norgesic Forte (contains aspirin)  Mobic (Meloxicam)  Oruvall (Ketoprofen)  Percodan (contains aspirin)  Relafen (Nabumetone)  Salsalate  Trilisate  Vitamin E (more than 400 mg per day)  Any medication containing aspirin                To speak with a nurse, schedule/reschedule/cancel a clinic appointment, or request a medication refill call: [phone]     You can also reach us by  MyChart: [email or web address]

## 2019-03-07 NOTE — NURSING NOTE
AVS reviewed with pt and given copy.  Pre-procedure instructions reviewed, including NPO orders,  needed, and medications to hold.  Pt verbalized understanding and declined having questions at this time.     Rylee Ordonez, RN, BSN

## 2019-03-07 NOTE — PROGRESS NOTES
"I had the pleasure of meeting Ms. Anthony Hayden on 3/7/2019 in the outpatient pain clinic in consult for Dr. Jones with regards to her back pain.  Subjective:  23 year old female with past medical history of multilevel spinal fusion presents for evaluation of low back pain. Pain has been present for about 4-5 years.  Pain has been worse over the past 2 years since the birth of her son.  Over the past 2 years, the pain has been getting progressively worse without any acute changes recently.  Pain started with starting work in 2014.  Has been having severe pain over the past week after dancing.  The pain is achy and throbbing. The pain is constant. Pain can be severe at times, but waxes and wanes in severity. Average pain is 6/10. Pain is better with rest, medications. Worse with prolonged activity.   The patient denies focal lower extremity weakness. No lower extremity sensory changes. No incontinence of bowel or bladder.    Location: low central and paracentral lumbar region with radiation laterally across the low back and down the back of bilateral legs with anterior thigh and bilateral toes.  Quality: Throbbing, aching, \"like something trying to stab out of my skin\" in low back and posterior thighs Stinging pins and needles in the anterior thigh and feet.  Severity: 6/10, least is 4/10, most severe \"20/10\"  Timing: No discernable pattern  Modifying Factors: Worse with dancing, increased activity.  Exercise worsens back pain, rest worsens leg symptoms.  Previously improved with heat, OTC meds, not any longer.  Associated Signs/Symptoms: weakness of the legs with near falls, no stereotypical muscle group or activity, also reports neck pain, shoulder pain, and frequent dropping of objects with the hands.  No persistent arm or leg symptoms, no bowel or bladder incontinence.  Also reports right sided occipital and temporal headache associated with neck and back pain.    Current treatments " include:  None    Previous medication treatments included:  Anti-convulsants: None tried  Muscle relaxors: cyclobenzaprine, non beneficial  Anti-depressants: TCAs, SNRIs not tried  NSAIDs: Non beneficial  Acetaminophen: Non beneficial  Topicals: reports OTC topical medications non beneficial  Opioids: Used previously    Other treatments have included:  Anthony Hayden has not been seen at a pain clinic in the past.    physical therapy: previously, multiple years ago, non-beneficial  Acupuncture: not tried  TENs Unit: Uses, previously beneficial  Injections: not tried    Past Medical History:   Diagnosis Date     Depression     past medical history reviewed with patient.   Past Surgical History:   Procedure Laterality Date     BACK SURGERY       VASCULAR SURGERY      past surgical history reviewed with patient.   Medications:  No current outpatient medications on file.     No current facility-administered medications for this visit.      [place] and [place] Prescription Monitoring Program reviewed    Allergies:     Allergies   Allergen Reactions     Phenergan  [Adgan] Anaphylaxis     Phenergan [Promethazine Hcl]      Cats      Dry itchy eyes       Pertussis Vaccine      Doxycycline Rash, Anxiety, Hives and Itching     Family History:  family history is not on file.  Social history: she lives in [place]. she is not currently working. She worked previously at a manufacturing plant.  She is , stays home with her 2 year old son, and presents today with her .  Answers for HPI/ROS submitted by the patient on 2/21/2019   LEILA 7 TOTAL SCORE: 20  General Symptoms: Yes  Skin Symptoms: No  HENT Symptoms: No  EYE SYMPTOMS: No  HEART SYMPTOMS: No  LUNG SYMPTOMS: Yes  INTESTINAL SYMPTOMS: Yes  URINARY SYMPTOMS: No  GYNECOLOGIC SYMPTOMS: No  BREAST SYMPTOMS: No  SKELETAL SYMPTOMS: Yes  BLOOD SYMPTOMS: No  NERVOUS SYSTEM SYMPTOMS: Yes  MENTAL HEALTH SYMPTOMS: Yes  Fever: No  Loss of appetite: Yes  Weight loss: No  Weight gain:  "No  Fatigue: No  Night sweats: Yes  Chills: No  Increased stress: Yes  Excessive hunger: No  Excessive thirst: Yes  Feeling hot or cold when others believe the temperature is normal: Yes  Loss of height: No  Post-operative complications: No  Surgical site pain: Yes  Hallucinations: No  Change in or Loss of Energy: Yes  Hyperactivity: No  Confusion: Yes  Cough: Yes  Sputum or phlegm: No  Coughing up blood: No  Difficulty breating or shortness of breath: No  Snoring: Yes  Wheezing: No  Difficulty breathing on exertion: No  Nighttime Cough: Yes  Difficulty breathing when lying flat: No  Heart burn or indigestion: Yes  Nausea: Yes  Vomiting: Yes  Abdominal pain: Yes  Bloating: No  Constipation: No  Diarrhea: No  Blood in stool: No  Black stools: No  Rectal or Anal pain: No  Fecal incontinence: No  Yellowing of skin or eyes: No  Vomit with blood: No  Change in stools: No  Back pain: Yes  Muscle aches: Yes  Neck pain: Yes  Swollen joints: No  Joint pain: Yes  Bone pain: No  Muscle cramps: Yes  Muscle weakness: Yes  Joint stiffness: No  Bone fracture: No  Trouble with coordination: No  Dizziness or trouble with balance: Yes  Fainting or black-out spells: No  Memory loss: Yes  Headache: Yes  Seizures: No  Speech problems: No  Tingling: Yes  Tremor: No  Weakness: Yes  Difficulty walking: No  Paralysis: No  Numbness: No  Nervous or Anxious: Yes  Depression: No  Trouble sleeping: Yes  Trouble thinking or concentrating: Yes  Mood changes: No  Panic attacks: Yes    Objective:   /73   Pulse 79   Resp 16   Ht 1.6 m (5' 3\")   Wt 59 kg (130 lb)   BMI 23.03 kg/m    Body mass index is 23.03 kg/m .  General: In no apparent distress  Mental status: Normal affect, pleasant  Head: Atraumatic, normocephalic  Eyes: Extra-ocular movements grossly intact, no scleral icterus  Cardiovascular: Regular rate  Respiratory: No respiratory distress  Abdomen: soft, non-distended  Msk: Bilateral hips, knees have normal range of motion. Pain " with extension and rotation of lumbar spine  Multiple trigger points identified in the neck and shoulders causing radiating pain down the arms, down the legs, and into the neck and head.  TTP over the low lumbar spinous processes and paraspinal musculature without radiation.  Moderate TTP over bilateral SI joints.  Pain with palpation of bilateral piriformis muscles with radiation down the posterior thighs with palpation and seated piriformis stretch.  Neuro: AAOx3.   CN II-XII are grossly intact.   Strength 5/5 for bilateral hip flexion, knee extension, dorsiflexion, EHL, and plantarflexion. Sensation intact to light touch throughout the L2-S1 dermatomes of the bilateral lower extremities with the exception of decreased sharp sensation over bilateral anterolateral thighs. Reflexes 2+/4 and symmetric for bilateral patellar, achilles.  Negative babinski bilaterally. No clonus on ankle jerk  Skin: No rashes or lesions noted on exposed areas of skin    Imaging:   MR LUMBAR SPINE W/O CONTRAST 12/18/2018 3:33 PM     History: low back pain s/p scoliosis fusion down to L3.; Chronic  bilateral low back pain without sciatica; Chronic bilateral low back  pain without sciatica.     ICD-10: Chronic bilateral low back pain without sciatica; Chronic  bilateral low back pain without sciatica     Comparison: None     Technique: Sagittal STIR, T1-weighted turbo spin-echo, 3-D volumetric  T2-weighted and axial reconstructed T2-weighted images of the lumbar  spine were obtained without intravenous contrast.      Findings: Regarding numbering convention, there are 5 lumbar-type  vertebrae assumed for the purposes of this dictation. Surgical changes  of instrumented fusion visualized from T11 through L3 with  transpedicular screws and interconnecting fusion rods. No bone marrow  edema. Mild scoliosis with convexity to the left. Normal alignment of  the lumbar vertebra. Conus medullaris at L1-L2 junction. Normal cauda  equina  fibers.     Small posterior disc bulge at L3-4. No spinal canal or neural  foraminal stenosis.     Small posterior disc bulge at L4-5. Facet arthropathy mild neural  foraminal stenosis bilaterally. Mild neural foraminal stenosis  bilaterally.     Disc degeneration at L5-S1 with posterior disc bulge and superimposed  central disc protrusion and central annular fissure. Accompanying mild  degenerative facet arthropathy. No spinal canal or neural foraminal  stenosis.      No spinal canal or neural foraminal stenosis at other levels.     Partially visualized 1.6 cm left physiologic follicular cyst.                                                                       Impression:  1. Multilevel lumbar spondylosis, most pronounced at L4-5 with mild  neural foraminal stenosis.  2. Central annular fissure at L5-S1.  3. Multilevel posterior fusion at the starting from T10 through to L3.     Assessment:  1. Myofascial pain: Widespread myofascial pain and tenderness noted with multiple trigger points with radiating pain identified.  Likely primary contributor to her widespread pain outside the lumbar spine and secondary contributor to lumbar spine pain.  Worsened in the setting of pre-existing anxiety disorder and long standing chronic pain.  Will benefit from PT, CBT, biofeedback, and TPI which were performed today in clinic.  2. Lumbar spondylosis: Likely primary generator of low back pain in the setting of multiple level thoracolumbar fusion.  Worst by MRI at L4/L5, will try MBB to evaluate for benefit from RFA  3. Piriformis syndrome: As described in history and as provoked on exam.  Referral to PT for stretching guidance.  Consider piriformis injection in the future if continues to have difficulty  4. Meralgia paresthetica: No symptoms prior to pregnancy with persistent painful numbness over bilateral anterolateral thighs.  Reports has tried lidocaine topical before without benefit.  Has not tried neuropathic pain  medication, will trial gabapentin  5. Headaches: Likely multifactorial with some component of myofascial neck pain contributing to posterior right sided headache.  Consider TIN blocks in the future.    Barriers:  1. Patient has multiple painful areas and most of her pain is exacerbated by external stressors, anxiety disorders, and fear avoidant behaviors    Plan:   1. Patient education: I went over the above diagnoses and treatment plan with her and answered all of her questions.  2. Imaging review: MRI and XR reviewed.  3. Exercise program: Referral to PT to evaluate for therapeutic movements and stretching for myofascial pain and piriformis syndrome.  Also recommended Thera cane to patient for home massage.  4. Medications: Trial gabapentin titration with goal dose of 600mg TID  5. Imaging Orders: No further imaging at this time  6. Interventions:   1. Plan for bilateral lumbar MBB  2. Consider bilateral TIN blocks for migraine headaches  3. Consider repeat TPI for recurrent myofascial pain  4. Consider piriformis injection in the future if unresponsive to PT  Based on history and physical exam, unlikely tumor or mass causing pain  7. Referrals:   1. Physical therapy for myofascial pain, piriformis syndrome  2. Behavioral health for CBT, biofeedback for myofascial pain, headaches, chronic widespread pain  8. Follow up: 2-3 months    Thank you for the consult.   Kaz Morales  PGY5  Pain Medicine Clinical Fellow  Department of Anesthesiology and Pain Management    TRIGGER POINT INJECTION PROCEDURE NOTE     INDICATION FOR PROCEDURE:   Anthony Hayden  is a 23 year old old patient with myofascial pain resulting in difficulty with activities of daily living and reduced quality of life.   Her baseline symptoms have been recalcitrant to oral & transdermal medications and conservative therapy. She is here today for trigger point injections.   GOAL OF PROCEDURE:   The goal of this procedure is to increase active range of  motion, improve volitional motor control and enhance functional independence associated with dystonic movements.   TOTAL DOSE ADMINISTERED:   Medication used: Triamcinolone 40 mg and Bupivacaine 0.5%  Total Volume of Diluent Used: 10 ml     EQUIPMENT USED:   10 ml syringe, 0.5 inch 30 gauge needle   SKIN PREPARATION:   Skin preparation was performed using chloroprep.     PROCEDURE DETAILS:   The patient was met in the exam  room, where the patient was identified by name, medical record number and date of birth.  All of the patient s last minute questions were answered. Written informed consent was obtained and saved in the electronic medical record, after the risks, benefits, and alternatives were discussed with the patient.   A formal time-out procedure was performed, as per protocol, including patient name, title of procedure, and site of procedure, and all in the room concurred.   We  identified 11 trigger points in the bilateral neck and shoulder region by manual palpation. Solution containing 9 mL of 0.5 percent bupivacaine and 1 mL of triamcinolone 40 milligrams per mL was injected into these trigger points 0.5- 1 mL each.     MUSCLE INJECTED  - Trapezius- bilateral  -  Rhomboid major and minor- bilateral   -  Longus capitis- bilateral   -  Cervical paraspinals-bilateral   -  Infraspinatus - bilateral    RESPONSE TO PROCEDURE: Anthony tolerated the procedure well and there were no immediate complications. She was allowed to recover for an appropriate period of time and was discharged home in stable condition.   .  FOLLOW UP:   Anthony was asked to follow up by phone in 7-14 days  to report her response to this injection.    Physician Attestation   I saw and evaluated Anthony Hayden.  I agree with above history, review of systems, physical exam and plan.  I have reviewed the content of the documentation and have edited it as needed. I have personally performed the services documented here and the documentation  accurately represents those services and the decisions I have made.     Rainer Holguin MD, PhD    [facility]  [address]  [address] [facility] [id number] [phone]  Dept: [phone]

## 2019-03-07 NOTE — LETTER
"3/7/2019       RE: Anthony Hayden  [address] [place], [place] [id number]     Dear Colleague,    Thank you for referring your patient, Anthony Hayden, to the [facility]. Please see a copy of my visit note below.    I had the pleasure of meeting Ms. Anthony Hayden on 3/7/2019 in the outpatient pain clinic in consult for Dr. Jones with regards to her back pain.    Subjective:  23 year old female with past medical history of multilevel spinal fusion presents for evaluation of low back pain. Pain has been present for about 4-5 years.  Pain has been worse over the past 2 years since the birth of her son.  Over the past 2 years, the pain has been getting progressively worse without any acute changes recently.  Pain started with starting work in 2014.  Has been having severe pain over the past week after dancing.  The pain is achy and throbbing. The pain is constant. Pain can be severe at times, but waxes and wanes in severity. Average pain is 6/10. Pain is better with rest, medications. Worse with prolonged activity.   The patient denies focal lower extremity weakness. No lower extremity sensory changes. No incontinence of bowel or bladder.    Location: low central and paracentral lumbar region with radiation laterally across the low back and down the back of bilateral legs with anterior thigh and bilateral toes.  Quality: Throbbing, aching, \"like something trying to stab out of my skin\" in low back and posterior thighs Stinging pins and needles in the anterior thigh and feet.  Severity: 6/10, least is 4/10, most severe \"20/10\"  Timing: No discernable pattern  Modifying Factors: Worse with dancing, increased activity.  Exercise worsens back pain, rest worsens leg symptoms.  Previously improved with heat, OTC meds, not any longer.  Associated Signs/Symptoms: weakness of the legs with near falls, no stereotypical muscle " "group or activity, also reports neck pain, shoulder pain, and frequent dropping of objects with the hands.  No persistent arm or leg symptoms, no bowel or bladder incontinence.  Also reports right sided occipital and temporal headache associated with neck and back pain.    Current treatments include:  None    Previous medication treatments included:  Anti-convulsants: None tried  Muscle relaxors: cyclobenzaprine, non beneficial  Anti-depressants: TCAs, SNRIs not tried  NSAIDs: Non beneficial  Acetaminophen: Non beneficial  Topicals: reports OTC topical medications non beneficial  Opioids: Used previously    Other treatments have included:  Anthony Hayden has not been seen at a pain clinic in the past.    physical therapy: previously, multiple years ago, non-beneficial  Acupuncture: not tried  TENs Unit: Uses, previously beneficial  Injections: not tried    Past Medical History:   Diagnosis Date     Depression     past medical history reviewed with patient.   Past Surgical History:   Procedure Laterality Date     BACK SURGERY       VASCULAR SURGERY      past surgical history reviewed with patient.   Medications:  No current outpatient medications on file.     No current facility-administered medications for this visit.      [place] and [place] Prescription Monitoring Program reviewed    Allergies:     Allergies   Allergen Reactions     Phenergan  [Adgan] Anaphylaxis     Phenergan [Promethazine Hcl]      Cats      Dry itchy eyes       Pertussis Vaccine      Doxycycline Rash, Anxiety, Hives and Itching       Family History:  family history is not on file.  Social history: she lives in [place]. she is not currently working. She worked previously at a manufacturing plant.  She is , stays home with her 2 year old son, and presents today with her .    Objective:   /73   Pulse 79   Resp 16   Ht 1.6 m (5' 3\")   Wt 59 kg (130 lb)   BMI 23.03 kg/m     Body mass index is 23.03 kg/m .  General: In no apparent " distress  Mental status: Normal affect, pleasant  Head: Atraumatic, normocephalic  Eyes: Extra-ocular movements grossly intact, no scleral icterus  Cardiovascular: Regular rate  Respiratory: No respiratory distress  Abdomen: soft, non-distended  Msk: Bilateral hips, knees have normal range of motion. Pain with extension and rotation of lumbar spine  Multiple trigger points identified in the neck and shoulders causing radiating pain down the arms, down the legs, and into the neck and head.  TTP over the low lumbar spinous processes and paraspinal musculature without radiation.  Moderate TTP over bilateral SI joints.  Pain with palpation of bilateral piriformis muscles with radiation down the posterior thighs with palpation and seated piriformis stretch.  Neuro: AAOx3.   CN II-XII are grossly intact.   Strength 5/5 for bilateral hip flexion, knee extension, dorsiflexion, EHL, and plantarflexion. Sensation intact to light touch throughout the L2-S1 dermatomes of the bilateral lower extremities with the exception of decreased sharp sensation over bilateral anterolateral thighs. Reflexes 2+/4 and symmetric for bilateral patellar, achilles.  Negative babinski bilaterally. No clonus on ankle jerk  Skin: No rashes or lesions noted on exposed areas of skin    Imaging:   MR LUMBAR SPINE W/O CONTRAST 12/18/2018 3:33 PM     History: low back pain s/p scoliosis fusion down to L3.; Chronic  bilateral low back pain without sciatica; Chronic bilateral low back  pain without sciatica.     ICD-10: Chronic bilateral low back pain without sciatica; Chronic  bilateral low back pain without sciatica     Comparison: None     Technique: Sagittal STIR, T1-weighted turbo spin-echo, 3-D volumetric  T2-weighted and axial reconstructed T2-weighted images of the lumbar  spine were obtained without intravenous contrast.      Findings: Regarding numbering convention, there are 5 lumbar-type  vertebrae assumed for the purposes of this dictation.  Surgical changes  of instrumented fusion visualized from T11 through L3 with  transpedicular screws and interconnecting fusion rods. No bone marrow  edema. Mild scoliosis with convexity to the left. Normal alignment of  the lumbar vertebra. Conus medullaris at L1-L2 junction. Normal cauda  equina fibers.     Small posterior disc bulge at L3-4. No spinal canal or neural  foraminal stenosis.     Small posterior disc bulge at L4-5. Facet arthropathy mild neural  foraminal stenosis bilaterally. Mild neural foraminal stenosis  bilaterally.     Disc degeneration at L5-S1 with posterior disc bulge and superimposed  central disc protrusion and central annular fissure. Accompanying mild  degenerative facet arthropathy. No spinal canal or neural foraminal  stenosis.      No spinal canal or neural foraminal stenosis at other levels.     Partially visualized 1.6 cm left physiologic follicular cyst.                                                                    Impression:  1. Multilevel lumbar spondylosis, most pronounced at L4-5 with mild  neural foraminal stenosis.  2. Central annular fissure at L5-S1.  3. Multilevel posterior fusion at the starting from T10 through to L3.     Assessment:  1. Myofascial pain: Widespread myofascial pain and tenderness noted with multiple trigger points with radiating pain identified.  Likely primary contributor to her widespread pain outside the lumbar spine and secondary contributor to lumbar spine pain.  Worsened in the setting of pre-existing anxiety disorder and long standing chronic pain.  Will benefit from PT, CBT, biofeedback, and TPI which were performed today in clinic.  2. Lumbar spondylosis: Likely primary generator of low back pain in the setting of multiple level thoracolumbar fusion.  Worst by MRI at L4/L5, will try MBB to evaluate for benefit from RFA  3. Piriformis syndrome: As described in history and as provoked on exam.  Referral to PT for stretching guidance.  Consider  piriformis injection in the future if continues to have difficulty  4. Meralgia paresthetica: No symptoms prior to pregnancy with persistent painful numbness over bilateral anterolateral thighs.  Reports has tried lidocaine topical before without benefit.  Has not tried neuropathic pain medication, will trial gabapentin  5. Headaches: Likely multifactorial with some component of myofascial neck pain contributing to posterior right sided headache.  Consider TIN blocks in the future.    Barriers:  1. Patient has multiple painful areas and most of her pain is exacerbated by external stressors, anxiety disorders, and fear avoidant behaviors    Plan:   1. Patient education: I went over the above diagnoses and treatment plan with her and answered all of her questions.  2. Imaging review: MRI and XR reviewed.  3. Exercise program: Referral to PT to evaluate for therapeutic movements and stretching for myofascial pain and piriformis syndrome.  Also recommended Thera cane to patient for home massage.  4. Medications: Trial gabapentin titration with goal dose of 600mg TID  5. Imaging Orders: No further imaging at this time  6. Interventions:   1. Plan for bilateral lumbar MBB  2. Consider bilateral TIN blocks for migraine headaches  3. Consider repeat TPI for recurrent myofascial pain  4. Consider piriformis injection in the future if unresponsive to PT  Based on history and physical exam, unlikely tumor or mass causing pain  7. Referrals:   1. Physical therapy for myofascial pain, piriformis syndrome  2. Behavioral health for CBT, biofeedback for myofascial pain, headaches, chronic widespread pain  8. Follow up: 2-3 months    Thank you for the consult.   Kaz Morales  PGY5  Pain Medicine Clinical Fellow  Department of Anesthesiology and Pain Management    TRIGGER POINT INJECTION PROCEDURE NOTE     INDICATION FOR PROCEDURE:   Sybilmaribeth CHASE Hayden  is a 23 year old old patient with myofascial pain resulting in difficulty with  activities of daily living and reduced quality of life.   Her baseline symptoms have been recalcitrant to oral & transdermal medications and conservative therapy. She is here today for trigger point injections.   GOAL OF PROCEDURE:   The goal of this procedure is to increase active range of motion, improve volitional motor control and enhance functional independence associated with dystonic movements.   TOTAL DOSE ADMINISTERED:   Medication used: Triamcinolone 40 mg and Bupivacaine 0.5%  Total Volume of Diluent Used: 10 ml     EQUIPMENT USED:   10 ml syringe, 0.5 inch 30 gauge needle   SKIN PREPARATION:   Skin preparation was performed using chloroprep.     PROCEDURE DETAILS:   The patient was met in the exam  room, where the patient was identified by name, medical record number and date of birth.  All of the patient s last minute questions were answered. Written informed consent was obtained and saved in the electronic medical record, after the risks, benefits, and alternatives were discussed with the patient.   A formal time-out procedure was performed, as per protocol, including patient name, title of procedure, and site of procedure, and all in the room concurred.   We  identified 11 trigger points in the bilateral neck and shoulder region by manual palpation. Solution containing 9 mL of 0.5 percent bupivacaine and 1 mL of triamcinolone 40 milligrams per mL was injected into these trigger points 0.5- 1 mL each.     MUSCLE INJECTED  - Trapezius- bilateral  -  Rhomboid major and minor- bilateral   -  Longus capitis- bilateral   -  Cervical paraspinals-bilateral   -  Infraspinatus - bilateral    RESPONSE TO PROCEDURE: Anthony tolerated the procedure well and there were no immediate complications. She was allowed to recover for an appropriate period of time and was discharged home in stable condition.   .  FOLLOW UP:   Anthony was asked to follow up by phone in 7-14 days  to report her response to this  injection.    Physician Attestation   I saw and evaluated Anthony Hayden.  I agree with above history, review of systems, physical exam and plan.  I have reviewed the content of the documentation and have edited it as needed. I have personally performed the services documented here and the documentation accurately represents those services and the decisions I have made.     Rainer Holguin MD, PhD    [facility]  [address] [place] [id number] [phone]  Dept: [phone]

## 2019-03-07 NOTE — PROGRESS NOTES
Spoke with: Anthony     Date Scheduled: 3/25/19     Provider: Dr. Holguin     : Yes     F/U Appointment: n/a     Patient was educated on pre-op nurse call: Yes      Direct procedure: no

## 2019-03-08 DIAGNOSIS — M47.816 LUMBAR FACET ARTHROPATHY: Primary | ICD-10-CM

## 2019-03-15 ENCOUNTER — THERAPY VISIT (OUTPATIENT)
Dept: PHYSICAL THERAPY | Facility: CLINIC | Age: 24
End: 2019-03-15
Payer: COMMERCIAL

## 2019-03-15 DIAGNOSIS — M54.50 LUMBAGO: ICD-10-CM

## 2019-03-15 DIAGNOSIS — M47.816 LUMBAR FACET ARTHROPATHY: ICD-10-CM

## 2019-03-15 PROCEDURE — 97161 PT EVAL LOW COMPLEX 20 MIN: CPT | Mod: GP | Performed by: PHYSICAL THERAPIST

## 2019-03-15 PROCEDURE — 97110 THERAPEUTIC EXERCISES: CPT | Mod: GP | Performed by: PHYSICAL THERAPIST

## 2019-03-15 NOTE — PROGRESS NOTES
[place] [facility] Initial Evaluation  Subjective:  The history is provided by the patient. No  was used.   Anthony Hayden is a 23 year old female with a lumbar condition.  Condition occurred with:  Other reason.    This is a chronic condition  Pt reports she had surgery 10 yrs ago for scoliosis. She has rods in back fromT5-L3. She reports she did well until she gave birth to her baby in Nov 2016. She reports her vertebrae are deteriorating. She has been seen at the pain clinic and received an injection in her shoulders and is scheduled for her LB to be injected on March 25 2019. Her pain MD referred her to PT on 3-14-19. Her pain iall over her LB and thoracic spine as well as her shoulders. She is also having pain in the buttocks and down both legs..        Pain is described as aching, shooting and stabbing and is constant and reported as 8/10.  Associated symptoms:  Loss of strength. Pain is the same all the time.  Exacerbated by: sx can fluctuate. and relieved by heat and rest.  Since onset symptoms are gradually worsening.                                                      Objective:  Standing Alignment:        Lumbar:  Lordosis incr                           Lumbar/SI Evaluation  ROM:    AROM Lumbar:   Flexion:         Hands to ankles not painful   Ext:                      Side Bend:        Left:  Moves mid lumbar  painful    Right:  Hurts lumbar   Rotation:           Left:     Right:   Side Glide:        Left:     Right:           Lumbar Myotomes:  normal            Lumbar DTR's:  not assessed      Cord Signs:  not assessed    Lumbar Dermtomes:  normal                Neural Tension/Mobility:  Lumbar:  Normal        Lumbar Palpation:  Palpation (lumbar): T6.  Tenderness present at Left:    Erector Spinae and Vertebral  Tenderness present at Right: Erector Spinae and Vertebral  Functional Tests:  not assessed        Lumbar Provocation:  normal      Spinal Segmental  Conclusions: Possibility of hyper mobility of lower lumbar spine.                                                        General     ROS    Assessment/Plan:    Patient is a 23 year old female with lumbar complaints.    Patient has the following significant findings with corresponding treatment plan.                Diagnosis 1:  LBP scoliosis  Pain -  hot/cold therapy and manual therapy  Decreased ROM/flexibility - manual therapy and therapeutic exercise  Impaired muscle performance - neuro re-education  Decreased function - therapeutic activities      Therapy Evaluation Codes:         Cumulative Therapy Evaluation is: Low complexity.    Previous and current functional limitations:  (See Goal Flow Sheet for this information)    Short term and Long term goals: (See Goal Flow Sheet for this information)     Communication ability:  Patient appears to be able to clearly communicate and understand verbal and written communication and follow directions correctly.  Treatment Explanation - The following has been discussed with the patient:   RX ordered/plan of care  Anticipated outcomes  Possible risks and side effects  This patient would benefit from PT intervention to resume normal activities.   Rehab potential is good.    Frequency:  2 X a month, once daily  Duration:  for 3 months  Discharge Plan:  Achieve all LTG.  Independent in home treatment program.  Reach maximal therapeutic benefit.    Please refer to the daily flowsheet for treatment today, total treatment time and time spent performing 1:1 timed codes.

## 2019-03-20 RX ORDER — PRAZOSIN HYDROCHLORIDE 1 MG/1
1 CAPSULE ORAL AT BEDTIME
COMMUNITY

## 2019-03-20 RX ORDER — TRAZODONE HYDROCHLORIDE 50 MG/1
50-100 TABLET, FILM COATED ORAL AT BEDTIME
COMMUNITY

## 2019-03-20 NOTE — PROGRESS NOTES
[place] [facility] Initial Evaluation  Subjective:                                       Pertinent medical history includes:  Anemia, concussions/dizziness, migraines/headaches and sleep disorder/apnea (Numbness/tingling, severe headaches, pain at night/rest, social and general anxiety disorder).    Other surgeries include:  Other (Scoliosis-rods/T5-L3).  Medication history: Gabapentin.    Employment status: None.                                  Objective:  System    Physical Exam    General     ROS    Assessment/Plan:

## 2019-03-25 ENCOUNTER — ANCILLARY PROCEDURE (OUTPATIENT)
Dept: RADIOLOGY | Facility: AMBULATORY SURGERY CENTER | Age: 24
End: 2019-03-25
Attending: ANESTHESIOLOGY
Payer: COMMERCIAL

## 2019-03-25 ENCOUNTER — HOSPITAL ENCOUNTER (OUTPATIENT)
Facility: AMBULATORY SURGERY CENTER | Age: 24
End: 2019-03-25
Attending: ANESTHESIOLOGY
Payer: COMMERCIAL

## 2019-03-25 VITALS
OXYGEN SATURATION: 98 % | BODY MASS INDEX: 23.04 KG/M2 | RESPIRATION RATE: 16 BRPM | DIASTOLIC BLOOD PRESSURE: 69 MMHG | HEIGHT: 63 IN | WEIGHT: 130 LBS | HEART RATE: 71 BPM | SYSTOLIC BLOOD PRESSURE: 103 MMHG | TEMPERATURE: 97.7 F

## 2019-03-25 DIAGNOSIS — R52 PAIN: ICD-10-CM

## 2019-03-25 LAB
HCG UR QL: NEGATIVE
INTERNAL QC OK POCT: YES

## 2019-03-25 RX ORDER — BUPIVACAINE HYDROCHLORIDE 2.5 MG/ML
INJECTION, SOLUTION EPIDURAL; INFILTRATION; INTRACAUDAL PRN
Status: DISCONTINUED | OUTPATIENT
Start: 2019-03-25 | End: 2019-03-25 | Stop reason: HOSPADM

## 2019-03-25 RX ORDER — LIDOCAINE HYDROCHLORIDE 10 MG/ML
INJECTION, SOLUTION EPIDURAL; INFILTRATION; INTRACAUDAL; PERINEURAL PRN
Status: DISCONTINUED | OUTPATIENT
Start: 2019-03-25 | End: 2019-03-25 | Stop reason: HOSPADM

## 2019-03-25 ASSESSMENT — MIFFLIN-ST. JEOR: SCORE: 1308.81

## 2019-03-25 NOTE — OP NOTE
Patient: Anthony Hayden Age: 23 year old   MRN: [id number] Attending: Dr. Holguin     Date of Visit: March 25, 2019      PAIN MEDICINE CLINIC PROCEDURE NOTE    ATTENDING CLINICIAN:    Rainer Holguin MD    ASSISTANT CLINICIAN:  Kaz Lieberman MD    PREPROCEDURE DIAGNOSES:  1. Lumbar facet arthropathy   2. Chronic low back pain     POSTPROCEDURE DIAGNOSES:  1. Lumbar facet arthropathy   2. Chronic low back pain     PROCEDURE(S) PERFORMED:  1. Bilateral L5 dorsal ramus nerve blocks  2. Right L3 and L4 medial branch nerve blocks  3. Aborted Left L3 and L4 medial branch nerve blocks  4.  Fluoroscopic guidance for the above procedures    ANESTHESIA:  Local.    BLOOD LOSS:  Minimal.    DRAINS AND SPECIMENS:  None.    COMPLICATIONS:  None.    INDICATIONS:    Anthony Hayden is a 23 year old female with a history of low back pain secondary to lumar facet joint arthopathy .  The patient stated that the patient was in their usual state of health and denied recent anticoagulant use or recent infections.  Therefore, the plan is to perform above mentioned procedure.     Procedure Details:    The patient was met in the procedure room, where the patient was identified by name, medical record number and date of birth.  All of the patient s last minute questions were answered. Written informed consent was obtained and saved in the electronic medical record, after the risks, benefits, and alternatives were discussed with the patient.      A formal time-out procedure was performed, as per protocol, including patient name, title of procedure, and site of procedure, and all in the room concurred.  Routine monitors were applied.      The patient was placed in the prone position on the procedure room table.  All pressure points were checked and comfortably padded.  Routine monitors were placed.  Vital signs were stable.    A chlorhexidine prep was completed followed by sterile draping per standard procedure.     We identified each lumbar  vertebral body after first identifying the S1 vertebrae.  Fluoroscope was then obliqued towards the patient's right in order to identify the pedicles of the L4, L5 and sacral ala .  The targets were recognized at the junction of the transverse process and the superior articular process (and sacral ala for L5 medial branch nerves). Skin and subcutaneous tissues overlying this area were anesthetized with a 1% lidocaine. Under fluoroscopic guidance, we directed the 3.5 inch spinal needle through the skin and subcutaneous tissues until osseous contact was achieved.   After a negative aspirate to make sure that there was no intravascular placement, Isovue was then injected to confirm no vascular runoff.   At that point, the stylets were removed 0.5 mL lidocaine was injected.  As we attempted to perform the similar procedure in the left side L3 and L4 medial branch nerves, patient reported excruciating pain and wanted us to stop the procedure.  The left-sided L3-L4 medial branch nerve blocks are aborted.    Light pressure was held at the puncture site(s) to prevent ecchymosis and oozing.  The patient's skin was cleansed, and hemostasis was confirmed.  Band-aids were applied to the needle injection site(s).      Condition:    The patient tolerated the procedure well and was monitored for approximately 15 minutes afterward in the post procedure area.  There were no immediate post procedure complications noted.  The patient was then discharged to home as per protocol.     Patient condition  stable.    Preprocedure pain score: 9/10  Postprocedure pain score: 10/10

## 2019-03-25 NOTE — DISCHARGE INSTRUCTIONS
Home Care Instructions after a Medial Branch Block      In a medial branch block, a local anesthetic (numbing medicine) is injected near the medial branch nerve. This stops the transmission of pain signals from the facet joint. If this reduces your pain and improves your mobility, it may tell the doctor which facet joint is causing the pain. This procedure is a diagnostic procedure and is typically short lasting. With this injection, a steroid to increase the longevity of the blocks effect may or may not be used.    Activity  -You may resume most normal activity levels with the exception of strenuous activity. It is important for us to know if your pain with normal activity is relieved after this injection.  -DO NOT shower for 24 hours  -DO NOT remove bandaid for 24 hours    Pain  -You may experience soreness at the injection site for one or two days  -You may use an ice pack for 20 minutes every 2 hours for the first 24 hours  -You may use a heating pad after the first 24 hours  -You may use Tylenol (acetaminophen) every 4 hours or other pain medicines as     directed by your physician    You may experience numbness radiating into your legs or arms (depending on the procedure location). This numbness may last several hours. Until sensation returns to normal; please use caution in walking, climbing stairs, and stepping out of your vehicle, etc.    DID YOU RECEIVE SEDATION TODAY?  No        DID YOU RECEIVE STEROIDS TODAY?  No          PLEASE KEEP TRACK OF YOUR SYMPTOMS AND NOTE YOUR IMPROVEMENT FOR YOUR DOCTOR.     Please contact us if you have:  -Severe pain  -Fever more than 101.5 degrees Fahrenheit  -Signs of infection at the injection site (redness, swelling, or drainage)    If you have questions, please contact our office at [phone] between the hours of 7:00 am and 3:00 pm Monday through Friday. After office hours you can contact the on call provider by dialing [phone]. If you need immediate attention,  we recommend that you go to a hospital emergency room or dial 911.

## 2019-07-02 PROBLEM — M54.50 LUMBAGO: Status: RESOLVED | Noted: 2019-03-15 | Resolved: 2019-07-02

## 2019-11-08 ENCOUNTER — HEALTH MAINTENANCE LETTER (OUTPATIENT)
Age: 24
End: 2019-11-08

## 2020-02-23 ENCOUNTER — HEALTH MAINTENANCE LETTER (OUTPATIENT)
Age: 25
End: 2020-02-23

## 2020-12-06 ENCOUNTER — HEALTH MAINTENANCE LETTER (OUTPATIENT)
Age: 25
End: 2020-12-06

## 2021-09-25 ENCOUNTER — HEALTH MAINTENANCE LETTER (OUTPATIENT)
Age: 26
End: 2021-09-25

## 2022-01-15 ENCOUNTER — HEALTH MAINTENANCE LETTER (OUTPATIENT)
Age: 27
End: 2022-01-15

## 2022-07-02 ENCOUNTER — HOSPITAL ENCOUNTER (EMERGENCY)
Facility: CLINIC | Age: 27
Discharge: HOME | End: 2022-07-02
Attending: EMERGENCY MEDICINE | Admitting: EMERGENCY MEDICINE

## 2022-07-02 ENCOUNTER — APPOINTMENT (OUTPATIENT)
Dept: RADIOLOGY | Facility: CLINIC | Age: 27
End: 2022-07-02
Attending: EMERGENCY MEDICINE

## 2022-07-02 VITALS
SYSTOLIC BLOOD PRESSURE: 130 MMHG | RESPIRATION RATE: 18 BRPM | HEIGHT: 63 IN | BODY MASS INDEX: 20.57 KG/M2 | TEMPERATURE: 97.9 F | HEART RATE: 81 BPM | DIASTOLIC BLOOD PRESSURE: 82 MMHG | OXYGEN SATURATION: 95 % | WEIGHT: 116.1 LBS

## 2022-07-02 DIAGNOSIS — S60.222A CONTUSION OF LEFT HAND, INITIAL ENCOUNTER: ICD-10-CM

## 2022-07-02 DIAGNOSIS — S60.221A CONTUSION OF RIGHT HAND, INITIAL ENCOUNTER: ICD-10-CM

## 2022-07-02 PROCEDURE — 99283 EMERGENCY DEPT VISIT LOW MDM: CPT

## 2022-07-02 PROCEDURE — 250N000013 HC RX MED GY IP 250 OP 250 PS 637: Performed by: EMERGENCY MEDICINE

## 2022-07-02 PROCEDURE — 73130 X-RAY EXAM OF HAND: CPT | Mod: RT

## 2022-07-02 RX ORDER — TRAZODONE HYDROCHLORIDE 100 MG/1
TABLET ORAL
COMMUNITY
Start: 2022-06-16

## 2022-07-02 RX ORDER — HYDROXYZINE HYDROCHLORIDE 25 MG/1
TABLET, FILM COATED ORAL
COMMUNITY
Start: 2022-03-22

## 2022-07-02 RX ORDER — IBUPROFEN 600 MG/1
600 TABLET, FILM COATED ORAL ONCE
Status: COMPLETED | OUTPATIENT
Start: 2022-07-02 | End: 2022-07-02

## 2022-07-02 RX ORDER — SERTRALINE HYDROCHLORIDE 100 MG/1
TABLET, FILM COATED ORAL
COMMUNITY
Start: 2022-05-16

## 2022-07-02 RX ADMIN — IBUPROFEN 600 MG: 600 TABLET ORAL at 04:35

## 2022-07-02 NOTE — ED NOTES
"Offered ice pack and patient declined.  Asked if she took acetaminophen or ibuprofen she said \"they don't work\"  "

## 2022-07-02 NOTE — ED TRIAGE NOTES
12 hours ago hit supporting wall in apartment.  No medications prior to arrival.     Triage Assessment     Row Name 07/02/22 0419       Triage Assessment (Adult)    Airway WDL WDL       Respiratory WDL    Respiratory WDL WDL       Skin Circulation/Temperature WDL    Skin Circulation/Temperature WDL WDL       Cardiac WDL    Cardiac WDL WDL       Peripheral/Neurovascular WDL    Peripheral Neurovascular WDL WDL       Cognitive/Neuro/Behavioral WDL    Cognitive/Neuro/Behavioral WDL WDL
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
## 2022-07-02 NOTE — DISCHARGE INSTRUCTIONS
Rest, ice, and elevate over the next couple days  Tylenol 650 mg every 4 hours as needed for pain  Ibuprofen 600 mg every 6 hours as needed for pain  Follow-up with your primary care doctor as needed if your pain is not improved  Resume normal activity as tolerated

## 2022-08-04 ENCOUNTER — APPOINTMENT (OUTPATIENT)
Dept: RADIOLOGY | Facility: CLINIC | Age: 27
End: 2022-08-04
Attending: EMERGENCY MEDICINE

## 2022-08-04 ENCOUNTER — HOSPITAL ENCOUNTER (EMERGENCY)
Facility: CLINIC | Age: 27
Discharge: HOME | End: 2022-08-04
Attending: EMERGENCY MEDICINE | Admitting: EMERGENCY MEDICINE

## 2022-08-04 VITALS
TEMPERATURE: 97.9 F | WEIGHT: 120 LBS | HEART RATE: 85 BPM | RESPIRATION RATE: 18 BRPM | DIASTOLIC BLOOD PRESSURE: 76 MMHG | HEIGHT: 62 IN | BODY MASS INDEX: 22.08 KG/M2 | OXYGEN SATURATION: 96 % | SYSTOLIC BLOOD PRESSURE: 102 MMHG

## 2022-08-04 DIAGNOSIS — S60.221A CONTUSION OF RIGHT HAND, INITIAL ENCOUNTER: ICD-10-CM

## 2022-08-04 PROCEDURE — 99283 EMERGENCY DEPT VISIT LOW MDM: CPT

## 2022-08-04 PROCEDURE — 73130 X-RAY EXAM OF HAND: CPT | Mod: RT
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
## 2022-08-04 NOTE — DISCHARGE INSTRUCTIONS
Rest, ice, and elevate over the next couple days  Tylenol 650 mg every 4 hours as needed for pain  Ibuprofen 600 mg every 6 hours as needed for pain  You can use your hand as tolerated

## 2023-04-22 ENCOUNTER — HEALTH MAINTENANCE LETTER (OUTPATIENT)
Age: 28
End: 2023-04-22

## 2024-06-29 ENCOUNTER — HEALTH MAINTENANCE LETTER (OUTPATIENT)
Age: 29
End: 2024-06-29

## (undated) DEVICE — NDL SPINAL 22GA 3.5" QUINCKE 405181

## (undated) DEVICE — PREP CHLORAPREP W/ORANGE TINT 10.5ML 260715

## (undated) DEVICE — GLOVE PROTEXIS POWDER FREE SMT 7.0  2D72PT70X

## (undated) DEVICE — TRAY PAIN INJECTION 97A 640

## (undated) RX ORDER — BUPIVACAINE HYDROCHLORIDE 5 MG/ML
INJECTION, SOLUTION EPIDURAL; INTRACAUDAL
Status: DISPENSED
Start: 2019-03-07

## (undated) RX ORDER — TRIAMCINOLONE ACETONIDE 40 MG/ML
INJECTION, SUSPENSION INTRA-ARTICULAR; INTRAMUSCULAR
Status: DISPENSED
Start: 2019-03-07

## (undated) RX ORDER — SODIUM CHLORIDE 9 MG/ML
INJECTION, SOLUTION INTRAVENOUS
Status: DISPENSED
Start: 2018-12-18

## (undated) RX ORDER — ONDANSETRON 2 MG/ML
INJECTION INTRAMUSCULAR; INTRAVENOUS
Status: DISPENSED
Start: 2018-12-18

## (undated) RX ORDER — FENTANYL CITRATE 50 UG/ML
INJECTION, SOLUTION INTRAMUSCULAR; INTRAVENOUS
Status: DISPENSED
Start: 2018-12-18